# Patient Record
Sex: FEMALE | Race: WHITE | Employment: OTHER | URBAN - METROPOLITAN AREA
[De-identification: names, ages, dates, MRNs, and addresses within clinical notes are randomized per-mention and may not be internally consistent; named-entity substitution may affect disease eponyms.]

---

## 2017-01-19 ENCOUNTER — OFFICE VISIT (OUTPATIENT)
Dept: INTERNAL MEDICINE CLINIC | Age: 67
End: 2017-01-19

## 2017-01-19 VITALS
WEIGHT: 158 LBS | SYSTOLIC BLOOD PRESSURE: 122 MMHG | BODY MASS INDEX: 26.98 KG/M2 | HEIGHT: 64 IN | DIASTOLIC BLOOD PRESSURE: 60 MMHG | HEART RATE: 76 BPM

## 2017-01-19 DIAGNOSIS — E03.9 ACQUIRED HYPOTHYROIDISM: Chronic | ICD-10-CM

## 2017-01-19 DIAGNOSIS — K44.9 HIATAL HERNIA: Chronic | ICD-10-CM

## 2017-01-19 DIAGNOSIS — D72.810 LYMPHOCYTOPENIA: ICD-10-CM

## 2017-01-19 DIAGNOSIS — I10 ESSENTIAL HYPERTENSION: Primary | Chronic | ICD-10-CM

## 2017-01-19 DIAGNOSIS — M47.819 SPONDYLOARTHROPATHY: Chronic | ICD-10-CM

## 2017-01-19 DIAGNOSIS — E78.00 PURE HYPERCHOLESTEROLEMIA: Chronic | ICD-10-CM

## 2017-01-19 PROCEDURE — 99214 OFFICE O/P EST MOD 30 MIN: CPT | Performed by: INTERNAL MEDICINE

## 2017-03-06 RX ORDER — METOPROLOL SUCCINATE 25 MG/1
TABLET, EXTENDED RELEASE ORAL
Qty: 30 TABLET | Refills: 5 | Status: SHIPPED | OUTPATIENT
Start: 2017-03-06 | End: 2017-09-01 | Stop reason: SDUPTHER

## 2017-03-29 ENCOUNTER — TELEPHONE (OUTPATIENT)
Dept: INTERNAL MEDICINE CLINIC | Age: 67
End: 2017-03-29

## 2017-03-29 RX ORDER — CITALOPRAM 10 MG/1
TABLET ORAL
Qty: 30 TABLET | Refills: 4 | Status: SHIPPED | OUTPATIENT
Start: 2017-03-29 | End: 2017-08-23 | Stop reason: SDUPTHER

## 2017-03-30 ENCOUNTER — OFFICE VISIT (OUTPATIENT)
Dept: INTERNAL MEDICINE CLINIC | Age: 67
End: 2017-03-30

## 2017-03-30 VITALS
BODY MASS INDEX: 27.25 KG/M2 | TEMPERATURE: 98 F | WEIGHT: 160 LBS | DIASTOLIC BLOOD PRESSURE: 61 MMHG | HEART RATE: 56 BPM | OXYGEN SATURATION: 98 % | SYSTOLIC BLOOD PRESSURE: 138 MMHG

## 2017-03-30 DIAGNOSIS — L30.9 DERMATITIS: Primary | ICD-10-CM

## 2017-03-30 PROCEDURE — 96372 THER/PROPH/DIAG INJ SC/IM: CPT | Performed by: INTERNAL MEDICINE

## 2017-03-30 PROCEDURE — 99213 OFFICE O/P EST LOW 20 MIN: CPT | Performed by: INTERNAL MEDICINE

## 2017-03-30 RX ORDER — METHYLPREDNISOLONE ACETATE 40 MG/ML
40 INJECTION, SUSPENSION INTRA-ARTICULAR; INTRALESIONAL; INTRAMUSCULAR; SOFT TISSUE ONCE
Status: COMPLETED | OUTPATIENT
Start: 2017-03-30 | End: 2017-03-30

## 2017-03-30 RX ORDER — BETAMETHASONE DIPROPIONATE 0.5 MG/G
CREAM TOPICAL
Qty: 1 TUBE | Refills: 0 | Status: SHIPPED | OUTPATIENT
Start: 2017-03-30 | End: 2017-04-04 | Stop reason: SDUPTHER

## 2017-03-30 RX ADMIN — METHYLPREDNISOLONE ACETATE 40 MG: 40 INJECTION, SUSPENSION INTRA-ARTICULAR; INTRALESIONAL; INTRAMUSCULAR; SOFT TISSUE at 13:59

## 2017-04-03 ENCOUNTER — TELEPHONE (OUTPATIENT)
Dept: DERMATOLOGY | Age: 67
End: 2017-04-03

## 2017-04-04 ENCOUNTER — OFFICE VISIT (OUTPATIENT)
Dept: DERMATOLOGY | Age: 67
End: 2017-04-04

## 2017-04-04 DIAGNOSIS — L30.9 DERMATITIS: ICD-10-CM

## 2017-04-04 PROCEDURE — 99202 OFFICE O/P NEW SF 15 MIN: CPT | Performed by: DERMATOLOGY

## 2017-04-04 RX ORDER — BETAMETHASONE DIPROPIONATE 0.5 MG/G
CREAM TOPICAL
Qty: 50 G | Refills: 0 | Status: SHIPPED | OUTPATIENT
Start: 2017-04-04 | End: 2017-07-24 | Stop reason: SDUPTHER

## 2017-05-18 ENCOUNTER — OFFICE VISIT (OUTPATIENT)
Dept: DERMATOLOGY | Age: 67
End: 2017-05-18

## 2017-05-18 DIAGNOSIS — L30.9 CHRONIC DERMATITIS: Primary | ICD-10-CM

## 2017-05-18 PROCEDURE — 99213 OFFICE O/P EST LOW 20 MIN: CPT | Performed by: DERMATOLOGY

## 2017-05-18 RX ORDER — CALCIUM CARBONATE 500(1250)
500 TABLET ORAL DAILY
COMMUNITY
End: 2020-04-28 | Stop reason: ALTCHOICE

## 2017-06-08 RX ORDER — SUMATRIPTAN 50 MG/1
TABLET, FILM COATED ORAL
Qty: 12 TABLET | Refills: 2 | Status: SHIPPED | OUTPATIENT
Start: 2017-06-08 | End: 2018-03-05 | Stop reason: SDUPTHER

## 2017-07-05 ENCOUNTER — TELEPHONE (OUTPATIENT)
Dept: DERMATOLOGY | Age: 67
End: 2017-07-05

## 2017-07-05 RX ORDER — PREDNISONE 10 MG/1
TABLET ORAL
Qty: 30 TABLET | Refills: 0 | Status: SHIPPED | OUTPATIENT
Start: 2017-07-05 | End: 2017-09-14 | Stop reason: ALTCHOICE

## 2017-07-14 ENCOUNTER — TELEPHONE (OUTPATIENT)
Dept: INTERNAL MEDICINE CLINIC | Age: 67
End: 2017-07-14

## 2017-07-24 ENCOUNTER — OFFICE VISIT (OUTPATIENT)
Dept: INTERNAL MEDICINE CLINIC | Age: 67
End: 2017-07-24

## 2017-07-24 VITALS
HEART RATE: 55 BPM | BODY MASS INDEX: 26.4 KG/M2 | DIASTOLIC BLOOD PRESSURE: 61 MMHG | SYSTOLIC BLOOD PRESSURE: 136 MMHG | OXYGEN SATURATION: 99 % | WEIGHT: 155 LBS

## 2017-07-24 DIAGNOSIS — L30.9 CHRONIC DERMATITIS: Primary | ICD-10-CM

## 2017-07-24 DIAGNOSIS — L30.9 DERMATITIS: ICD-10-CM

## 2017-07-24 PROCEDURE — 99212 OFFICE O/P EST SF 10 MIN: CPT | Performed by: NURSE PRACTITIONER

## 2017-07-24 RX ORDER — BETAMETHASONE DIPROPIONATE 0.5 MG/G
CREAM TOPICAL
Qty: 50 G | Refills: 2 | Status: SHIPPED | OUTPATIENT
Start: 2017-07-24 | End: 2018-09-20 | Stop reason: ALTCHOICE

## 2017-08-23 RX ORDER — CITALOPRAM 10 MG/1
TABLET ORAL
Qty: 30 TABLET | Refills: 0 | Status: SHIPPED | OUTPATIENT
Start: 2017-08-23 | End: 2017-09-19 | Stop reason: SDUPTHER

## 2017-08-24 RX ORDER — CANDESARTAN 16 MG/1
TABLET ORAL
Qty: 30 TABLET | Refills: 5 | Status: SHIPPED | OUTPATIENT
Start: 2017-08-24 | End: 2017-08-25 | Stop reason: SDUPTHER

## 2017-08-25 RX ORDER — CANDESARTAN 16 MG/1
TABLET ORAL
Qty: 30 TABLET | Refills: 5 | Status: SHIPPED | OUTPATIENT
Start: 2017-08-25 | End: 2017-10-30 | Stop reason: SDUPTHER

## 2017-08-29 ENCOUNTER — HOSPITAL ENCOUNTER (OUTPATIENT)
Dept: MAMMOGRAPHY | Age: 67
Discharge: HOME | End: 2017-08-29
Attending: INTERNAL MEDICINE | Admitting: INTERNAL MEDICINE

## 2017-08-29 DIAGNOSIS — Z12.31 VISIT FOR SCREENING MAMMOGRAM: ICD-10-CM

## 2017-09-01 RX ORDER — METOPROLOL SUCCINATE 25 MG/1
TABLET, EXTENDED RELEASE ORAL
Qty: 30 TABLET | Refills: 5 | Status: SHIPPED | OUTPATIENT
Start: 2017-09-01 | End: 2018-03-11 | Stop reason: SDUPTHER

## 2017-09-14 ENCOUNTER — OFFICE VISIT (OUTPATIENT)
Dept: INTERNAL MEDICINE CLINIC | Age: 67
End: 2017-09-14

## 2017-09-14 VITALS
WEIGHT: 157 LBS | HEART RATE: 62 BPM | SYSTOLIC BLOOD PRESSURE: 122 MMHG | BODY MASS INDEX: 26.74 KG/M2 | DIASTOLIC BLOOD PRESSURE: 70 MMHG | OXYGEN SATURATION: 96 %

## 2017-09-14 DIAGNOSIS — Z12.11 SCREENING FOR COLON CANCER: ICD-10-CM

## 2017-09-14 DIAGNOSIS — I10 ESSENTIAL HYPERTENSION: Primary | Chronic | ICD-10-CM

## 2017-09-14 DIAGNOSIS — E78.00 PURE HYPERCHOLESTEROLEMIA: Chronic | ICD-10-CM

## 2017-09-14 DIAGNOSIS — L30.9 DERMATITIS: ICD-10-CM

## 2017-09-14 DIAGNOSIS — D72.810 LYMPHOCYTOPENIA: ICD-10-CM

## 2017-09-14 DIAGNOSIS — F32.5 MAJOR DEPRESSIVE DISORDER WITH SINGLE EPISODE, IN FULL REMISSION (HCC): Chronic | ICD-10-CM

## 2017-09-14 DIAGNOSIS — K44.9 HIATAL HERNIA: Chronic | ICD-10-CM

## 2017-09-14 DIAGNOSIS — E03.9 ACQUIRED HYPOTHYROIDISM: Chronic | ICD-10-CM

## 2017-09-14 PROCEDURE — G0008 ADMIN INFLUENZA VIRUS VAC: HCPCS | Performed by: INTERNAL MEDICINE

## 2017-09-14 PROCEDURE — 90662 IIV NO PRSV INCREASED AG IM: CPT | Performed by: INTERNAL MEDICINE

## 2017-09-14 PROCEDURE — 99214 OFFICE O/P EST MOD 30 MIN: CPT | Performed by: INTERNAL MEDICINE

## 2017-09-14 ASSESSMENT — PATIENT HEALTH QUESTIONNAIRE - PHQ9
2. FEELING DOWN, DEPRESSED OR HOPELESS: 0
SUM OF ALL RESPONSES TO PHQ QUESTIONS 1-9: 0
SUM OF ALL RESPONSES TO PHQ9 QUESTIONS 1 & 2: 0
1. LITTLE INTEREST OR PLEASURE IN DOING THINGS: 0

## 2017-09-19 RX ORDER — CITALOPRAM 10 MG/1
TABLET ORAL
Qty: 30 TABLET | Refills: 5 | Status: SHIPPED | OUTPATIENT
Start: 2017-09-19 | End: 2018-03-22 | Stop reason: SDUPTHER

## 2017-09-26 RX ORDER — LEVOTHYROXINE SODIUM 0.12 MG/1
125 TABLET ORAL DAILY
Qty: 30 TABLET | Refills: 5 | Status: SHIPPED | OUTPATIENT
Start: 2017-09-26 | End: 2018-03-26 | Stop reason: SDUPTHER

## 2017-10-02 RX ORDER — FLUTICASONE PROPIONATE 50 MCG
SPRAY, SUSPENSION (ML) NASAL
Qty: 3 BOTTLE | Refills: 1 | Status: SHIPPED | OUTPATIENT
Start: 2017-10-02 | End: 2019-03-22 | Stop reason: SDUPTHER

## 2017-10-30 ENCOUNTER — TELEPHONE (OUTPATIENT)
Dept: DERMATOLOGY | Age: 67
End: 2017-10-30

## 2017-10-30 RX ORDER — CANDESARTAN 16 MG/1
TABLET ORAL
Qty: 90 TABLET | Refills: 1 | Status: SHIPPED | OUTPATIENT
Start: 2017-10-30 | End: 2018-09-19 | Stop reason: SDUPTHER

## 2017-10-30 NOTE — TELEPHONE ENCOUNTER
If her legs are a lot worse and the betamethasone is not helping, I want to see her back in the office to see what's going on.

## 2017-10-30 NOTE — TELEPHONE ENCOUNTER
Elkin Palmer has a rash on her lower leg that isn't clearing, her leg is also swollen where the rash is. She has been seen for this in the past and is wondering if Dr. Yang Borja could call in prednisone? The cream she is using isn't working any longer.     Pharmacy:  [facility] [address], [address] [phone]

## 2017-10-31 ENCOUNTER — OFFICE VISIT (OUTPATIENT)
Dept: DERMATOLOGY | Age: 67
End: 2017-10-31

## 2017-10-31 DIAGNOSIS — L30.9 CHRONIC DERMATITIS: Primary | ICD-10-CM

## 2017-10-31 PROCEDURE — 99213 OFFICE O/P EST LOW 20 MIN: CPT | Performed by: DERMATOLOGY

## 2017-10-31 RX ORDER — CLOBETASOL PROPIONATE 0.5 MG/G
CREAM TOPICAL
Qty: 60 G | Refills: 2 | Status: SHIPPED | OUTPATIENT
Start: 2017-10-31 | End: 2018-09-20 | Stop reason: ALTCHOICE

## 2017-10-31 NOTE — PROGRESS NOTES
[facility]  Chris Ospina MD  [address]  1950    75 y.o. female     Date of Visit: 10/31/2017    Chief Complaint: rash worse    History of Present Illness:    She returns today to follow-up for pretibial papular dermatitis of the legs. She reports flaring in recent weeks. She reports that Diprolene cream has not been as effective lately. Review of Systems:  Gen: Feels well, good sense of health. Skin: no other rash. Past Medical History, Family History, Surgical History, Medications and Allergies reviewed. Past Medical History:   Diagnosis Date    Allergic rhinitis     Anxiety     Depression     Hiatal hernia     Hyperlipidemia     Hypertension     Hypothyroidism     Migraine headache     Osteoporosis     Overactive bladder     Rupture of tendon of foot region     Right    Spondyloarthropathy ([address]     L5-S1     Past Surgical History:   Procedure Laterality Date    ANKLE SURGERY  1/17/11    Right gastroc lenghtening, proximal tibial bone graft triple arthrodesis of ankle    DILATION AND CURETTAGE OF UTERUS      x2    KNEE ARTHROSCOPY Left 1/28/15    TEAR DUCT SURGERY  2002       Allergies   Allergen Reactions    Cephalexin Hives     Outpatient Prescriptions Marked as Taking for the 10/31/17 encounter (Office Visit) with Sophia Domínguez MD   Medication Sig Dispense Refill    clobetasol (TEMOVATE) 0.05 % cream Apply to affected areas on the left leg daily under occlusion until improved.  60 g 2    candesartan (ATACAND) 16 MG tablet TAKE ONE TABLET BY MOUTH DAILY 90 tablet 1    fluticasone (FLONASE) 50 MCG/ACT nasal spray PLACE ONE SPRAY IN EACH NOSTRILS DAILY 3 Bottle 1    levothyroxine (SYNTHROID) 125 MCG tablet Take 1 tablet by mouth daily 30 tablet 5    citalopram (CELEXA) 10 MG tablet TAKE ONE TABLET BY MOUTH DAILY 30 tablet 5    Aspirin-Acetaminophen-Caffeine (EXCEDRIN MIGRAINE PO) Take by mouth      metoprolol succinate (TOPROL XL) 25 MG extended release tablet TAKE ONE TABLET BY MOUTH DAILY 30 tablet 5    augmented betamethasone dipropionate (DIPROLENE-AF) 0.05 % cream APPLY NIGHTLY UNDER OCCLUSION FOR UP TO 2 WEEKS OR UNTIL IMPROVED 50 g 2    SUMAtriptan (IMITREX) 50 MG tablet TAKE ONE TABLET BY MOUTH AS NEEDED FOR MIGRAINE FOR A SINGLE DOSE 12 tablet 2    calcium carbonate (OSCAL) 500 MG TABS tablet Take 500 mg by mouth daily      levothyroxine (SYNTHROID) 100 MCG tablet TAKE ONE TABLET BY MOUTH DAILY 30 tablet 11    Lansoprazole (PREVACID PO) Take 15 mg by mouth daily.  raloxifene (EVISTA) 60 MG tablet Take 60 mg by mouth daily.  Multiple Minerals-Vitamins (CITRACAL PLUS PO) Take 1 tablet by mouth 2 times daily (with meals).  Multiple Vitamins-Minerals (CENTRUM SILVER PO) Take 1 tablet by mouth daily.  Misc Intestinal Lou Regulat (ALIGN PO) Take  by mouth. Physical Examination       The following were examined and determined to be normal: Psych/Neuro, Head/face, Conjunctivae/eyelids, Gums/teeth/lips, Neck and RLE. The following were examined and determined to be abnormal: LLE. Well-appearing. 1.  Left lower leg with discrete and coalescing erythematous edematous papules and plaques with foci of crusting. Assessment and Plan     1. Pretibial papular dermatitis - flaring    Clobetasol cream daily under occlusion until improved and then as needed for recurrence.

## 2017-11-03 ENCOUNTER — TELEPHONE (OUTPATIENT)
Dept: OTHER | Facility: CLINIC | Age: 67
End: 2017-11-03

## 2017-11-03 NOTE — TELEPHONE ENCOUNTER
Valentine Will said that she was not interested and would not let me go into detail. Need to check status of FIT test and see if patient is planning on completing it.

## 2017-11-28 ENCOUNTER — OFFICE VISIT (OUTPATIENT)
Dept: DERMATOLOGY | Age: 67
End: 2017-11-28

## 2017-11-28 DIAGNOSIS — L30.9 CHRONIC DERMATITIS: Primary | ICD-10-CM

## 2017-11-28 PROCEDURE — 99212 OFFICE O/P EST SF 10 MIN: CPT | Performed by: DERMATOLOGY

## 2017-11-28 NOTE — PROGRESS NOTES
[facility] Dermatology  Ann Marie Leach MD  [address]  1950    75 y.o. female     Date of Visit: 11/28/2017    Chief Complaint: dermatitis    History of Present Illness:    She returns today to follow-up for pretibial papular dermatitis of the legs. She reports marked improvement with use of clobetasol cream nightly under occlusion. She has few purpuric patches today. She is also using an Aveeno eczema cream to moisturize. Review of Systems:  None. Past Medical History, Family History, Surgical History, Medications and Allergies reviewed. Past Medical History:   Diagnosis Date    Allergic rhinitis     Anxiety     Depression     Hiatal hernia     Hyperlipidemia     Hypertension     Hypothyroidism     Migraine headache     Osteoporosis     Overactive bladder     Rupture of tendon of foot region     Right    Spondyloarthropathy ([address]     L5-S1     Past Surgical History:   Procedure Laterality Date    ANKLE SURGERY  1/17/11    Right gastroc lenghtening, proximal tibial bone graft triple arthrodesis of ankle    DILATION AND CURETTAGE OF UTERUS      x2    KNEE ARTHROSCOPY Left 1/28/15    TEAR DUCT SURGERY  2002       Allergies   Allergen Reactions    Cephalexin Hives     Outpatient Prescriptions Marked as Taking for the 11/28/17 encounter (Office Visit) with Junior Alan MD   Medication Sig Dispense Refill    clobetasol (TEMOVATE) 0.05 % cream Apply to affected areas on the left leg daily under occlusion until improved.  60 g 2    candesartan (ATACAND) 16 MG tablet TAKE ONE TABLET BY MOUTH DAILY 90 tablet 1    fluticasone (FLONASE) 50 MCG/ACT nasal spray PLACE ONE SPRAY IN EACH NOSTRILS DAILY 3 Bottle 1    levothyroxine (SYNTHROID) 125 MCG tablet Take 1 tablet by mouth daily 30 tablet 5    citalopram (CELEXA) 10 MG tablet TAKE ONE TABLET BY MOUTH DAILY 30 tablet 5    Aspirin-Acetaminophen-Caffeine (EXCEDRIN MIGRAINE PO) Take by mouth

## 2017-12-28 ENCOUNTER — OFFICE VISIT (OUTPATIENT)
Dept: INTERNAL MEDICINE CLINIC | Age: 67
End: 2017-12-28

## 2017-12-28 VITALS
WEIGHT: 155 LBS | DIASTOLIC BLOOD PRESSURE: 90 MMHG | BODY MASS INDEX: 26.4 KG/M2 | HEART RATE: 60 BPM | SYSTOLIC BLOOD PRESSURE: 140 MMHG

## 2017-12-28 DIAGNOSIS — S09.90XA INJURY OF HEAD, INITIAL ENCOUNTER: ICD-10-CM

## 2017-12-28 DIAGNOSIS — G44.319 ACUTE POST-TRAUMATIC HEADACHE, NOT INTRACTABLE: ICD-10-CM

## 2017-12-28 DIAGNOSIS — H53.8 BLURRING, LEFT EYE: ICD-10-CM

## 2017-12-28 PROBLEM — G44.309 POST-TRAUMATIC HEADACHE: Status: ACTIVE | Noted: 2017-12-28

## 2017-12-28 PROCEDURE — 99214 OFFICE O/P EST MOD 30 MIN: CPT | Performed by: INTERNAL MEDICINE

## 2017-12-28 NOTE — PATIENT INSTRUCTIONS
Patient Education        Learning About a Closed Head Injury  What is a closed head injury? A closed head injury happens when your head gets hit hard. The strong force of the blow causes your brain to shake in your skull. This movement can cause the brain to bruise, swell, or tear. Sometimes nerves or blood vessels also get damaged. This can cause bleeding in or around the brain. A concussion is a type of closed head injury. What are the symptoms? If you have a mild concussion, you may have a mild headache or feel \"not quite right. \" These symptoms are common. They usually go away over a few days to 4 weeks. But sometimes after a concussion, you feel like you can't function as well as before the injury. And you have new symptoms. This is called postconcussive syndrome. You may:  · Find it harder to solve problems, think, concentrate, or remember. · Have headaches. · Have changes in your sleep patterns, such as not being able to sleep or sleeping all the time. · Have changes in your personality. · Not be interested in your usual activities. · Feel angry or anxious without a clear reason. · Lose your sense of taste or smell. · Be dizzy, lightheaded, or unsteady. It may be hard to stand or walk. How is a closed head injury treated? Any person who may have a concussion needs to see a doctor. Some people have to stay in the hospital to be watched. Others can go home safely. If you go home, follow your doctor's instructions. He or she will tell you if you need someone to watch you closely for the next 24 hours or longer. Rest is the best treatment. Get plenty of sleep at night. And try to rest during the day. · Avoid activities that are physically or mentally demanding. These include housework, exercise, and schoolwork. And don't play video games, send text messages, or use the computer. You may need to change your school or work schedule to be able to avoid these activities.   · Ask your doctor when it's okay to drive, ride a bike, or operate machinery. · Take an over-the-counter pain medicine, such as acetaminophen (Tylenol), ibuprofen (Advil, Motrin), or naproxen (Aleve). Be safe with medicines. Read and follow all instructions on the label. · Check with your doctor before you use any other medicines for pain. · Do not drink alcohol or use illegal drugs. They can slow recovery. They can also increase your risk of getting a second head injury. Follow-up care is a key part of your treatment and safety. Be sure to make and go to all appointments, and call your doctor if you are having problems. It's also a good idea to know your test results and keep a list of the medicines you take. Where can you learn more? Go to [email or web address].[email or web address] org and sign in to your [employer] account. Enter [phone] in the Qraved box to learn more about \"Learning About a Closed Head Injury. \"     If you do not have an account, please click on the \"Sign Up Now\" link. Current as of: October 14, 2016  Content Version: 11.4  © 7522-2378 Healthwise, [employer]. Care instructions adapted under license by [facility] ([facility]). If you have questions about a medical condition or this instruction, always ask your healthcare professional. [address] any warranty or liability for your use of this information.

## 2017-12-28 NOTE — PROGRESS NOTES
Assessment/Plan     1. Injury of head, initial encounter  Neurologic exam non-focal, but may have mild concussion. No evidence of subdural, but she will go to ED for any new or worsening neurologic symptoms. 2. Acute post-traumatic headache, not intractable  She was advised to discontinue scheduled Excedrine Migraine due to concerns about possible rebound. Does not tolerate NSAIDs, so will try Tylenol and ice. Patient will call if symptoms change or worsen. 3. Blurring, left eye  Etiology unclear, but she will see ophthalmologist or optometrist today for dilated slit lamp exam.            Tarsha Oropeza   YOB: 1950    Date of Visit:  12/28/2017    Prior to Visit Medications    Medication Sig Taking? Authorizing Provider   clobetasol (TEMOVATE) 0.05 % cream Apply to affected areas on the left leg daily under occlusion until improved. Yes Estella Linton MD   candesartan (ATACAND) 16 MG tablet TAKE ONE TABLET BY MOUTH DAILY Yes Mitra Hughes MD   levothyroxine (SYNTHROID) 125 MCG tablet Take 1 tablet by mouth daily Yes Mitra Hughes MD   citalopram (CELEXA) 10 MG tablet TAKE ONE TABLET BY MOUTH DAILY Yes Mitra Hughes MD   Aspirin-Acetaminophen-Caffeine (EXCEDRIN MIGRAINE PO) Take by mouth Yes Historical Provider, MD   metoprolol succinate (TOPROL XL) 25 MG extended release tablet TAKE ONE TABLET BY MOUTH DAILY Yes Mitra Hughes MD   augmented betamethasone dipropionate (DIPROLENE-AF) 0.05 % cream APPLY NIGHTLY UNDER OCCLUSION FOR UP TO 2 WEEKS OR UNTIL IMPROVED Yes Estella Linton MD   SUMAtriptan (IMITREX) 50 MG tablet TAKE ONE TABLET BY MOUTH AS NEEDED FOR MIGRAINE FOR A SINGLE DOSE Yes Mitra Hughes MD   calcium carbonate (OSCAL) 500 MG TABS tablet Take 500 mg by mouth daily Yes Historical Provider, MD   levothyroxine (SYNTHROID) 100 MCG tablet TAKE ONE TABLET BY MOUTH DAILY Yes Mitra Hughes MD   Lansoprazole (PREVACID PO) Take 15 mg by mouth daily.  Yes Historical

## 2018-02-27 ENCOUNTER — OFFICE VISIT (OUTPATIENT)
Dept: DERMATOLOGY | Age: 68
End: 2018-02-27

## 2018-02-27 DIAGNOSIS — L30.9 CHRONIC DERMATITIS: Primary | ICD-10-CM

## 2018-02-27 PROCEDURE — 99212 OFFICE O/P EST SF 10 MIN: CPT | Performed by: DERMATOLOGY

## 2018-02-27 NOTE — PROGRESS NOTES
[facility] Dermatology  Yael Michelle MD  [phone]      Calvin Servin  1950    75 y.o. female     Date of Visit: 2/27/2018    Chief Complaint: dermatitis    History of Present Illness:    She returns today to follow-up for chronic dermatitis of the legs (so called pretibial papular dermatitis). She reports good control since last visit - has overall been quiescent. Rarely uses clobetasol. Uses Aveeno cream to moisturize daily. Review of Systems:  None. Past Medical History, Family History, Surgical History, Medications and Allergies reviewed.     Past Medical History:   Diagnosis Date    Allergic rhinitis     Anxiety     Depression     Hiatal hernia     Hyperlipidemia     Hypertension     Hypothyroidism     Migraine headache     Osteoporosis     Overactive bladder     Rupture of tendon of foot region     Right    Spondyloarthropathy (H[facility])     L5-S1     Past Surgical History:   Procedure Laterality Date    ANKLE SURGERY  1/17/11    Right gastroc lenghtening, proximal tibial bone graft triple arthrodesis of ankle    DILATION AND CURETTAGE OF UTERUS      x2    KNEE ARTHROSCOPY Left 1/28/15    TEAR DUCT SURGERY  2002       Allergies   Allergen Reactions    Cephalexin Hives     Outpatient Prescriptions Marked as Taking for the 2/27/18 encounter (Office Visit) with Rasheed Telles MD   Medication Sig Dispense Refill    candesartan (ATACAND) 16 MG tablet TAKE ONE TABLET BY MOUTH DAILY 90 tablet 1    fluticasone (FLONASE) 50 MCG/ACT nasal spray PLACE ONE SPRAY IN EACH NOSTRILS DAILY 3 Bottle 1    levothyroxine (SYNTHROID) 125 MCG tablet Take 1 tablet by mouth daily 30 tablet 5    citalopram (CELEXA) 10 MG tablet TAKE ONE TABLET BY MOUTH DAILY 30 tablet 5    Aspirin-Acetaminophen-Caffeine (EXCEDRIN MIGRAINE PO) Take by mouth      metoprolol succinate (TOPROL XL) 25 MG extended release tablet TAKE ONE TABLET BY MOUTH DAILY 30 tablet 5    SUMAtriptan

## 2018-03-05 RX ORDER — SUMATRIPTAN 50 MG/1
TABLET, FILM COATED ORAL
Qty: 12 TABLET | Refills: 1 | Status: SHIPPED | OUTPATIENT
Start: 2018-03-05 | End: 2018-08-03 | Stop reason: SDUPTHER

## 2018-03-12 RX ORDER — METOPROLOL SUCCINATE 25 MG/1
TABLET, EXTENDED RELEASE ORAL
Qty: 30 TABLET | Refills: 5 | Status: SHIPPED | OUTPATIENT
Start: 2018-03-12 | End: 2018-09-12 | Stop reason: SDUPTHER

## 2018-03-20 ENCOUNTER — OFFICE VISIT (OUTPATIENT)
Dept: INTERNAL MEDICINE CLINIC | Age: 68
End: 2018-03-20

## 2018-03-20 VITALS
BODY MASS INDEX: 26.57 KG/M2 | SYSTOLIC BLOOD PRESSURE: 134 MMHG | DIASTOLIC BLOOD PRESSURE: 70 MMHG | HEART RATE: 72 BPM | WEIGHT: 156 LBS

## 2018-03-20 DIAGNOSIS — E03.9 ACQUIRED HYPOTHYROIDISM: Chronic | ICD-10-CM

## 2018-03-20 DIAGNOSIS — M47.819 SPONDYLOARTHROPATHY: Chronic | ICD-10-CM

## 2018-03-20 DIAGNOSIS — M81.0 AGE-RELATED OSTEOPOROSIS WITHOUT CURRENT PATHOLOGICAL FRACTURE: ICD-10-CM

## 2018-03-20 DIAGNOSIS — D72.810 LYMPHOCYTOPENIA: ICD-10-CM

## 2018-03-20 DIAGNOSIS — K44.9 HIATAL HERNIA: Chronic | ICD-10-CM

## 2018-03-20 DIAGNOSIS — F32.5 MAJOR DEPRESSIVE DISORDER WITH SINGLE EPISODE, IN FULL REMISSION (HCC): Chronic | ICD-10-CM

## 2018-03-20 DIAGNOSIS — I10 ESSENTIAL HYPERTENSION: Primary | Chronic | ICD-10-CM

## 2018-03-20 PROBLEM — H53.8 BLURRING, LEFT EYE: Status: RESOLVED | Noted: 2017-12-28 | Resolved: 2018-03-20

## 2018-03-20 PROBLEM — S09.90XA HEAD INJURY: Status: RESOLVED | Noted: 2017-12-28 | Resolved: 2018-03-20

## 2018-03-20 PROBLEM — G44.309 POST-TRAUMATIC HEADACHE: Status: RESOLVED | Noted: 2017-12-28 | Resolved: 2018-03-20

## 2018-03-20 PROCEDURE — 99214 OFFICE O/P EST MOD 30 MIN: CPT | Performed by: INTERNAL MEDICINE

## 2018-03-20 NOTE — PATIENT INSTRUCTIONS
Patient Education        Osteoporosis: Care Instructions  Your Care Instructions    Osteoporosis causes bones to become thin and weak. It is much more common in women than in men. Osteoporosis may be very advanced before you know you have it. Sometimes the first sign is a broken bone in the hip, spine, or wrist or sudden pain in your middle or lower back. Follow-up care is a key part of your treatment and safety. Be sure to make and go to all appointments, and call your doctor if you are having problems. It's also a good idea to know your test results and keep a list of the medicines you take. How can you care for yourself at home? · Your doctor may prescribe a bisphosphonate, such as risedronate (Actonel) or alendronate (Fosamax), for osteoporosis. If you are taking one of these medicines by mouth:  ¨ Take your medicine with a full glass of water when you first get up in the morning. ¨ Do not lie down, eat, drink a beverage, or take any other medicine for at least 30 minutes after taking the drug. This helps prevent stomach problems. ¨ Do not take your medicine late in the day if you forgot to take it in the morning. Skip it, and take the usual dose the next morning. ¨ If you have side effects, tell your doctor. He or she may prescribe another medicine. · Get enough calcium and vitamin D. The [place] [employer] recommends adults younger than age 46 need 1,000 mg of calcium and 600 IU of vitamin D each day. Women ages 46 to 79 need 1,200 mg of calcium and 600 IU of vitamin D each day. Men ages 46 to 79 need 1,000 mg of calcium and 600 IU of vitamin D each day. Adults 71 and older need 1,200 mg of calcium and 800 IU of vitamin D each day. ¨ Eat foods rich in calcium, like yogurt, cheese, milk, and dark green vegetables. This is a good way to get the calcium you need. You can get vitamin D from eggs, fatty fish, cereal, and milk. ¨ Talk to your doctor about taking a calcium plus vitamin D supplement.  Be

## 2018-03-20 NOTE — PROGRESS NOTES
noted.  Use of agents associated with hypertension: none. Sodium (mmol/L)   Date Value   09/21/2017 139    BUN (mg/dL)   Date Value   09/21/2017 9    Glucose (mg/dL)   Date Value   09/21/2017 86      Potassium (mmol/L)   Date Value   09/21/2017 4.0    CREATININE (mg/dL)   Date Value   09/21/2017 0.62         Hypothyroidism: Recent symptoms: fatigue, hair loss- improved on higher dose of Synthroid. She denies weight changes, dry skin, diarrhea and constipation. Patient is  taking her medication consistently on an empty stomach. No results found for: [facility]  Lab Results   Component Value Date    TSH 10.750 (H) 09/21/2017    TSH 3.010 01/31/2017    TSH 0.28 07/07/2016     GI Disorder:  Patient presents for follow-up of GERD- chronic. Current symptoms include none. Symptoms are unchanged since last visit. Patient denies nausea, heartburn, dysphagia, heartburn, vomiting, abdominal pain, abdominal bloating, constipation, diarrhea, melena and hematochezia. Current treatment includes: proton pump inhibitor- Prevacid 15 mg qd. Medication side effects:  none. Recent diagnostic testing: none. Mood Disorder:  Patient presents for follow-up of depression. Current complaints include: none. She denies anhedonia, depressed mood, tearfulness, feelings of hopelessness, insomnia, difficulty concentrating, irritability and excessive worry. Symptoms/signs of lili: none. External stressors: nothing new. Current treatment includes: Celexa- 10 mg qd. Medication side effects: none. Osteoporosis/osteopenia:  Current pharmacologic therapy and date started Evista (raloxifene)- 2012. Medication side effects: none. Last DXA:  3/16-  T score at spine -1.5 and lowest hip -1.5, which was better compared to previous scan. Current calcium intake is at least 1200 mg/day from diet and supplements: yes. She is currently taking 2000 IU/day of supplemental vitamin D.   Regular weight-bearing exercise: no.    Vit D, 25-Hydroxy (ng/ml)   Date Value   06/05/2012 34.5     Chronic Back Pain: Pain is unchanged. On average, pain is perceived as mild (1-3  pain scale). Change in quality of symptoms: no.  Associated symptoms: none. She denies weakness, change in gait, paresthesias, saddle anesthesia and new bowel or bladder dysfunction. Current treatment: home exercises. Medication side effects: not applicable . Recent diagnostic testing: none. Review of Systems  As documented in HPI    Physical Exam   Constitutional: She is oriented to person, place, and time. She appears well-developed and well-nourished. No distress. HENT:   Mouth/Throat: Oropharynx is clear and moist and mucous membranes are normal.   Eyes: Conjunctivae are normal.   Neck: Carotid bruit is not present. No thyroid mass and no thyromegaly present. Cardiovascular: Normal rate, regular rhythm, normal heart sounds, intact distal pulses and normal pulses. Exam reveals no gallop and no friction rub. No murmur heard. Pulmonary/Chest: Effort normal and breath sounds normal. No respiratory distress. She has no wheezes. She has no rhonchi. She has no rales. Abdominal: Soft. She exhibits no distension. There is no tenderness. Musculoskeletal: She exhibits no edema. There is no tenderness over the lumbar spine and sacral spine. Paraspinal muscle spasm/tenderness:  No.   Neurological: She is alert and oriented to person, place, and time. Skin: Skin is warm, dry and intact. Psychiatric: She has a normal mood and affect.  Her speech is normal and behavior is normal. Judgment and thought content normal. Cognition and memory are normal.

## 2018-03-22 RX ORDER — CITALOPRAM 10 MG/1
TABLET ORAL
Qty: 30 TABLET | Refills: 5 | Status: SHIPPED | OUTPATIENT
Start: 2018-03-22 | End: 2018-09-15 | Stop reason: SDUPTHER

## 2018-03-26 RX ORDER — LEVOTHYROXINE SODIUM 0.12 MG/1
TABLET ORAL
Qty: 30 TABLET | Refills: 5 | Status: SHIPPED | OUTPATIENT
Start: 2018-03-26 | End: 2018-09-19 | Stop reason: SDUPTHER

## 2018-05-29 ENCOUNTER — PATIENT MESSAGE (OUTPATIENT)
Dept: INTERNAL MEDICINE CLINIC | Age: 68
End: 2018-05-29

## 2018-05-29 RX ORDER — RALOXIFENE HYDROCHLORIDE 60 MG/1
60 TABLET, FILM COATED ORAL DAILY
Qty: 30 TABLET | Refills: 3 | Status: SHIPPED | OUTPATIENT
Start: 2018-05-29 | End: 2018-09-20 | Stop reason: SDUPTHER

## 2018-08-03 RX ORDER — SUMATRIPTAN 50 MG/1
TABLET, FILM COATED ORAL
Qty: 12 TABLET | Refills: 0 | Status: SHIPPED | OUTPATIENT
Start: 2018-08-03 | End: 2018-09-20 | Stop reason: SDUPTHER

## 2018-08-29 ENCOUNTER — HOSPITAL ENCOUNTER (OUTPATIENT)
Dept: GENERAL RADIOLOGY | Age: 68
Discharge: HOME | End: 2018-08-29
Attending: INTERNAL MEDICINE | Admitting: INTERNAL MEDICINE

## 2018-08-29 DIAGNOSIS — M81.0 AGE-RELATED OSTEOPOROSIS WITHOUT CURRENT PATHOLOGICAL FRACTURE: ICD-10-CM

## 2018-08-29 DIAGNOSIS — Z12.31 VISIT FOR SCREENING MAMMOGRAM: ICD-10-CM

## 2018-09-12 RX ORDER — METOPROLOL SUCCINATE 25 MG/1
TABLET, EXTENDED RELEASE ORAL
Qty: 30 TABLET | Refills: 0 | Status: SHIPPED | OUTPATIENT
Start: 2018-09-12 | End: 2018-10-14 | Stop reason: SDUPTHER

## 2018-09-17 RX ORDER — CITALOPRAM 10 MG/1
TABLET ORAL
Qty: 30 TABLET | Refills: 5 | Status: SHIPPED | OUTPATIENT
Start: 2018-09-17 | End: 2019-03-13 | Stop reason: SDUPTHER

## 2018-09-20 ENCOUNTER — OFFICE VISIT (OUTPATIENT)
Dept: INTERNAL MEDICINE CLINIC | Age: 68
End: 2018-09-20

## 2018-09-20 VITALS
DIASTOLIC BLOOD PRESSURE: 70 MMHG | HEIGHT: 65 IN | BODY MASS INDEX: 25.99 KG/M2 | HEART RATE: 64 BPM | OXYGEN SATURATION: 98 % | WEIGHT: 156 LBS | SYSTOLIC BLOOD PRESSURE: 132 MMHG

## 2018-09-20 DIAGNOSIS — E78.00 PURE HYPERCHOLESTEROLEMIA: Chronic | ICD-10-CM

## 2018-09-20 DIAGNOSIS — K44.9 HIATAL HERNIA: Chronic | ICD-10-CM

## 2018-09-20 DIAGNOSIS — E03.4 HYPOTHYROIDISM DUE TO ACQUIRED ATROPHY OF THYROID: ICD-10-CM

## 2018-09-20 DIAGNOSIS — M81.0 AGE-RELATED OSTEOPOROSIS WITHOUT CURRENT PATHOLOGICAL FRACTURE: ICD-10-CM

## 2018-09-20 DIAGNOSIS — F32.5 MAJOR DEPRESSIVE DISORDER WITH SINGLE EPISODE, IN FULL REMISSION (HCC): Chronic | ICD-10-CM

## 2018-09-20 DIAGNOSIS — I10 ESSENTIAL HYPERTENSION: Primary | Chronic | ICD-10-CM

## 2018-09-20 PROCEDURE — 3288F FALL RISK ASSESSMENT DOCD: CPT | Performed by: INTERNAL MEDICINE

## 2018-09-20 PROCEDURE — G0008 ADMIN INFLUENZA VIRUS VAC: HCPCS | Performed by: INTERNAL MEDICINE

## 2018-09-20 PROCEDURE — 99214 OFFICE O/P EST MOD 30 MIN: CPT | Performed by: INTERNAL MEDICINE

## 2018-09-20 PROCEDURE — 90662 IIV NO PRSV INCREASED AG IM: CPT | Performed by: INTERNAL MEDICINE

## 2018-09-20 RX ORDER — SUMATRIPTAN 50 MG/1
TABLET, FILM COATED ORAL
Qty: 12 TABLET | Refills: 3 | Status: SHIPPED | OUTPATIENT
Start: 2018-09-20 | End: 2019-04-16 | Stop reason: ALTCHOICE

## 2018-09-20 RX ORDER — RALOXIFENE HYDROCHLORIDE 60 MG/1
60 TABLET, FILM COATED ORAL DAILY
Qty: 30 TABLET | Refills: 5 | Status: SHIPPED | OUTPATIENT
Start: 2018-09-20 | End: 2019-03-16 | Stop reason: SDUPTHER

## 2018-09-20 NOTE — PROGRESS NOTES
Vaccine Information Sheet, \"Influenza - Inactivated\"  given to Glynn Juan, or parent/legal guardian of  Glynn Juan and verbalized understanding. Patient responses:    Have you ever had a reaction to a flu vaccine? No  Are you able to eat eggs without adverse effects? Yes  Do you have any current illness? No  Have you ever had [place] Syndrome? No    Flu vaccine given per order. Please see immunization tab.

## 2018-09-20 NOTE — PROGRESS NOTES
Assessment/Plan     1. Essential hypertension  Well-controlled. Continue current medications. - Comprehensive Metabolic Panel, Fasting; Future    2. Pure hypercholesterolemia  Importance of continued lifestyle changes stressed to help prevent need for cholesterol medication in the future. Has been intolerant to Lipitor in the past.  - Lipid, Fasting; Future    3. Hypothyroidism due to acquired atrophy of thyroid  Clinically euthyroid, but will adjust levothyroxine dose if indicated by lab results.   - TSH without Reflex; Future    4. Hiatal hernia  Asymptomatic on low dose PPI- has been unable to wean to H2 blocker in the past, so benefits of chronic PPI outweigh risks. Will continue to monitor renal function and bone density. 5. Major depressive disorder with single episode, in full remission ([address]  Well-controlled on low dose Celexa, which she would like to continue. 6. Age-related osteoporosis without current pathological fracture  Does not yet meet criteria for bisphosphonate per FRAX, but losing significant bone density despite Evista. She would like to try regular strength training for the next 2 years, then recheck dexa. If bone density drops any further, will switch to yearly Reclast.  Oral bisphosphonates relatively contraindicated due to GERD. Return in about 6 months (around 3/20/2019). Rusty Bourgeois   YOB: 1950    Date of Visit:  9/20/2018    Allergies   Allergen Reactions    Cephalexin Hives      Prior to Visit Medications    Medication Sig Taking?  Authorizing Provider   levothyroxine (SYNTHROID) 125 MCG tablet TAKE ONE TABLET BY MOUTH DAILY Yes Bakari Fleming MD   candesartan (ATACAND) 16 MG tablet TAKE ONE TABLET BY MOUTH DAILY Yes Bakari Fleming MD   citalopram (CELEXA) 10 MG tablet TAKE ONE TABLET BY MOUTH DAILY Yes Bakari Fleming MD   metoprolol succinate (TOPROL XL) 25 MG extended release tablet TAKE ONE TABLET BY MOUTH DAILY Yes stomach. GI Disorder:  Patient presents for follow-up of GERD- chronic. Current symptoms include none. Symptoms are unchanged since last visit. Patient denies nausea, heartburn, dysphagia, heartburn, vomiting, abdominal pain, abdominal bloating, constipation, diarrhea, melena and hematochezia. Current treatment includes: proton pump inhibitor- Prevacid 15 mg qd. Medication side effects:  none. Recent diagnostic testing: none.       Mood Disorder:  Patient presents for follow-up of depression. Current complaints include: none. She denies anhedonia, depressed mood, tearfulness, feelings of hopelessness, insomnia, difficulty concentrating, irritability and excessive worry. Symptoms/signs of lili: none. External stressors: nothing new. Current treatment includes: Celexa- 10 mg qd. Medication side effects: none. Osteoporosis/osteopenia:  Current pharmacologic therapy and date started Evista (raloxifene)- 2012. Medication side effects: none. Last DXA:  8/18-  T score at spine -2.0 and lowest hip -1.9, which was worse compared to previous scan. Current calcium intake is at least 1200 mg/day from diet and supplements: yes. She is currently taking 2000 IU/day of supplemental vitamin D. Regular weight-bearing exercise: no, but planning to start working with .     Lab Results   Component Value Date    LDLCALC 145 (H) 09/21/2017    LDLCALC 142 (H) 01/31/2017    TRIG 122 09/21/2017    HDL 62 09/21/2017     Lab Results   Component Value Date    GLUF 83 03/20/2018    GLUCOSE 86 09/21/2017     Lab Results   Component Value Date     03/20/2018    K 4.3 03/20/2018    BUN 10 03/20/2018    CREATININE <0.5 (L) 03/20/2018    LABGLOM >60 03/20/2018    GFRAA >60 03/20/2018    CALCIUM 9.1 03/20/2018    VITD25 36.5 03/20/2018     Lab Results   Component Value Date    ALT 20 03/20/2018    AST 23 03/20/2018     Lab Results   Component Value Date    HGB 12.5 03/20/2018     Lab Results   Component

## 2018-10-14 RX ORDER — METOPROLOL SUCCINATE 25 MG/1
TABLET, EXTENDED RELEASE ORAL
Qty: 30 TABLET | Refills: 5 | Status: SHIPPED | OUTPATIENT
Start: 2018-10-14 | End: 2019-03-25 | Stop reason: SDUPTHER

## 2019-02-15 RX ORDER — SUMATRIPTAN 50 MG/1
TABLET, FILM COATED ORAL
Qty: 12 TABLET | Refills: 0 | Status: SHIPPED | OUTPATIENT
Start: 2019-02-15 | End: 2019-03-19 | Stop reason: SDUPTHER

## 2019-03-13 RX ORDER — CITALOPRAM 10 MG/1
TABLET ORAL
Qty: 30 TABLET | Refills: 4 | Status: SHIPPED | OUTPATIENT
Start: 2019-03-13 | End: 2019-10-14 | Stop reason: SDUPTHER

## 2019-03-17 RX ORDER — RALOXIFENE HYDROCHLORIDE 60 MG/1
TABLET, FILM COATED ORAL
Qty: 30 TABLET | Refills: 5 | Status: SHIPPED | OUTPATIENT
Start: 2019-03-17 | End: 2019-09-16 | Stop reason: SDUPTHER

## 2019-03-19 ENCOUNTER — OFFICE VISIT (OUTPATIENT)
Dept: INTERNAL MEDICINE CLINIC | Age: 69
End: 2019-03-19
Payer: MEDICARE

## 2019-03-19 VITALS
SYSTOLIC BLOOD PRESSURE: 130 MMHG | WEIGHT: 156 LBS | DIASTOLIC BLOOD PRESSURE: 76 MMHG | OXYGEN SATURATION: 98 % | HEART RATE: 78 BPM | BODY MASS INDEX: 26.36 KG/M2

## 2019-03-19 DIAGNOSIS — E78.00 PURE HYPERCHOLESTEROLEMIA: ICD-10-CM

## 2019-03-19 DIAGNOSIS — E03.4 HYPOTHYROIDISM DUE TO ACQUIRED ATROPHY OF THYROID: ICD-10-CM

## 2019-03-19 DIAGNOSIS — I10 ESSENTIAL HYPERTENSION: Primary | ICD-10-CM

## 2019-03-19 DIAGNOSIS — K44.9 HIATAL HERNIA: Chronic | ICD-10-CM

## 2019-03-19 DIAGNOSIS — F32.5 MAJOR DEPRESSIVE DISORDER WITH SINGLE EPISODE, IN FULL REMISSION (HCC): Chronic | ICD-10-CM

## 2019-03-19 PROCEDURE — 99214 OFFICE O/P EST MOD 30 MIN: CPT | Performed by: INTERNAL MEDICINE

## 2019-03-19 PROCEDURE — G8510 SCR DEP NEG, NO PLAN REQD: HCPCS | Performed by: INTERNAL MEDICINE

## 2019-03-19 RX ORDER — ONDANSETRON 4 MG/1
4 TABLET, FILM COATED ORAL DAILY PRN
Qty: 30 TABLET | Refills: 1 | Status: SHIPPED | OUTPATIENT
Start: 2019-03-19 | End: 2019-09-03 | Stop reason: SDUPTHER

## 2019-03-19 RX ORDER — CANDESARTAN 16 MG/1
TABLET ORAL
Qty: 90 TABLET | Refills: 1 | Status: SHIPPED | OUTPATIENT
Start: 2019-03-19 | End: 2019-09-16 | Stop reason: SDUPTHER

## 2019-03-19 ASSESSMENT — PATIENT HEALTH QUESTIONNAIRE - PHQ9
2. FEELING DOWN, DEPRESSED OR HOPELESS: 0
SUM OF ALL RESPONSES TO PHQ QUESTIONS 1-9: 0
1. LITTLE INTEREST OR PLEASURE IN DOING THINGS: 0
SUM OF ALL RESPONSES TO PHQ9 QUESTIONS 1 & 2: 0
SUM OF ALL RESPONSES TO PHQ QUESTIONS 1-9: 0

## 2019-03-25 RX ORDER — METOPROLOL SUCCINATE 25 MG/1
TABLET, EXTENDED RELEASE ORAL
Qty: 90 TABLET | Refills: 1 | Status: SHIPPED | OUTPATIENT
Start: 2019-03-25 | End: 2019-12-26

## 2019-03-26 RX ORDER — LEVOTHYROXINE SODIUM 0.12 MG/1
TABLET ORAL
Qty: 30 TABLET | Refills: 5 | Status: SHIPPED | OUTPATIENT
Start: 2019-03-26 | End: 2019-09-29 | Stop reason: SDUPTHER

## 2019-04-16 RX ORDER — SUMATRIPTAN 50 MG/1
TABLET, FILM COATED ORAL
Qty: 12 TABLET | Refills: 1 | Status: SHIPPED | OUTPATIENT
Start: 2019-04-16 | End: 2020-05-05

## 2019-05-20 ENCOUNTER — TELEPHONE (OUTPATIENT)
Dept: INTERNAL MEDICINE CLINIC | Age: 69
End: 2019-05-20

## 2019-05-20 NOTE — TELEPHONE ENCOUNTER
Patient is experiencing Indigestion x 1 month and is requesting an appointment with Dr. Ki Evans. It was suggested that she could possibly be seen by another provider today but patient refuled. Please contact patient at the number provided to advise.

## 2019-05-21 ENCOUNTER — OFFICE VISIT (OUTPATIENT)
Dept: INTERNAL MEDICINE CLINIC | Age: 69
End: 2019-05-21
Payer: MEDICARE

## 2019-05-21 VITALS
DIASTOLIC BLOOD PRESSURE: 68 MMHG | HEART RATE: 65 BPM | WEIGHT: 156 LBS | SYSTOLIC BLOOD PRESSURE: 120 MMHG | OXYGEN SATURATION: 95 % | BODY MASS INDEX: 26.36 KG/M2

## 2019-05-21 DIAGNOSIS — R10.13 EPIGASTRIC PAIN: ICD-10-CM

## 2019-05-21 PROCEDURE — 99214 OFFICE O/P EST MOD 30 MIN: CPT | Performed by: INTERNAL MEDICINE

## 2019-05-21 NOTE — PROGRESS NOTES
Assessment/Plan     1. Epigastric pain  Suspect related to slower motility secondary to chronic use of Benadryl, exacerbation of GERD. She will use topical steroid instead of Benadryl for her chronic LE rash, continue daily Prevacid, and try to back off on the Zofran. If bowel habits do not return to normal within 1-2 weeks, she will start daily Miralax. GI referral provided since overdue for colonoscopy and needs EGD for Byrd's surveillance. Patient will call if symptoms change or worsen. - Nanda Umanzor MD, Gastroenterology, [facility]   YOB: 1950    Date of Visit:  5/21/2019    Allergies   Allergen Reactions    Cephalexin Hives      Prior to Visit Medications    Medication Sig Taking? Authorizing Provider   cimetidine (TAGAMET HB) 200 MG tablet Take 200 mg by mouth 4 times daily Yes Historical Provider, MD   SUMAtriptan (IMITREX) 50 MG tablet TAKE ONE TABLET BY MOUTH AT ONSET OF HEADACHE; MAY REPEAT ONE TABLET IN 2 HOURS IF NEEDED.  Yes Brenna Maloney MD   levothyroxine (SYNTHROID) 125 MCG tablet TAKE ONE TABLET BY MOUTH DAILY Yes Brenna Maloney MD   metoprolol succinate (TOPROL XL) 25 MG extended release tablet TAKE ONE TABLET BY MOUTH DAILY Yes Brenna Maloney MD   fluticasone (FLONASE) 50 MCG/ACT nasal spray SPRAY ONE SPRAY IN EACH NOSTRIL ONCE DAILY Yes Brenna Maloney MD   candesartan (ATACAND) 16 MG tablet TAKE ONE TABLET BY MOUTH DAILY Yes Brenna Maloney MD   ondansetron (ZOFRAN) 4 MG tablet Take 1 tablet by mouth daily as needed for Nausea or Vomiting Yes Brenna Maloney MD   raloxifene (EVISTA) 60 MG tablet TAKE ONE TABLET BY MOUTH DAILY Yes Brenna Maloney MD   citalopram (CELEXA) 10 MG tablet TAKE ONE TABLET BY MOUTH DAILY Yes Brenna Maloney MD   Aspirin-Acetaminophen-Caffeine (EXCEDRIN MIGRAINE PO) Take by mouth Yes Historical Provider, MD   calcium carbonate (OSCAL) 500 MG TABS tablet Take 500 mg by mouth daily Yes Oropharynx is clear and moist and mucous membranes are normal. No oropharyngeal exudate or posterior oropharyngeal erythema. Eyes: No scleral icterus. Cardiovascular: Normal rate, regular rhythm and normal heart sounds. Exam reveals no gallop and no friction rub. No murmur heard. Pulmonary/Chest: Effort normal and breath sounds normal. No respiratory distress. She has no decreased breath sounds. She has no wheezes. She has no rhonchi. She has no rales. She exhibits no tenderness. Abdominal: Soft. Bowel sounds are normal. She exhibits no distension and no mass. There is tenderness (epigastric- mild). There is no rebound and no guarding. Musculoskeletal: She exhibits no edema or tenderness. Lymphadenopathy:     She has no cervical adenopathy. Neurological: She is alert and oriented to person, place, and time. Skin: Skin is warm and dry. No rash noted. No erythema. No jaundice   Psychiatric: She has a normal mood and affect.  Her behavior is normal.

## 2019-09-03 RX ORDER — ONDANSETRON 4 MG/1
TABLET, FILM COATED ORAL
Qty: 30 TABLET | Refills: 3 | Status: SHIPPED | OUTPATIENT
Start: 2019-09-03 | End: 2020-09-21

## 2019-09-16 RX ORDER — RALOXIFENE HYDROCHLORIDE 60 MG/1
TABLET, FILM COATED ORAL
Qty: 30 TABLET | Refills: 5 | Status: SHIPPED | OUTPATIENT
Start: 2019-09-16 | End: 2020-03-12

## 2019-09-16 RX ORDER — CANDESARTAN 16 MG/1
TABLET ORAL
Qty: 90 TABLET | Refills: 5 | Status: SHIPPED | OUTPATIENT
Start: 2019-09-16 | End: 2020-03-19 | Stop reason: SDUPTHER

## 2019-09-24 ENCOUNTER — OFFICE VISIT (OUTPATIENT)
Dept: INTERNAL MEDICINE CLINIC | Age: 69
End: 2019-09-24
Payer: MEDICARE

## 2019-09-24 VITALS
OXYGEN SATURATION: 99 % | BODY MASS INDEX: 26.12 KG/M2 | WEIGHT: 153 LBS | HEIGHT: 64 IN | DIASTOLIC BLOOD PRESSURE: 72 MMHG | HEART RATE: 64 BPM | SYSTOLIC BLOOD PRESSURE: 128 MMHG

## 2019-09-24 DIAGNOSIS — I10 ESSENTIAL HYPERTENSION: ICD-10-CM

## 2019-09-24 DIAGNOSIS — E78.00 PURE HYPERCHOLESTEROLEMIA: ICD-10-CM

## 2019-09-24 DIAGNOSIS — Z00.00 ROUTINE GENERAL MEDICAL EXAMINATION AT A HEALTH CARE FACILITY: Primary | ICD-10-CM

## 2019-09-24 DIAGNOSIS — R68.2 DRY MOUTH: ICD-10-CM

## 2019-09-24 DIAGNOSIS — E03.4 HYPOTHYROIDISM DUE TO ACQUIRED ATROPHY OF THYROID: ICD-10-CM

## 2019-09-24 DIAGNOSIS — K44.9 HIATAL HERNIA: Chronic | ICD-10-CM

## 2019-09-24 DIAGNOSIS — R53.82 CHRONIC FATIGUE: ICD-10-CM

## 2019-09-24 PROCEDURE — 99213 OFFICE O/P EST LOW 20 MIN: CPT | Performed by: INTERNAL MEDICINE

## 2019-09-24 PROCEDURE — G0438 PPPS, INITIAL VISIT: HCPCS | Performed by: INTERNAL MEDICINE

## 2019-09-24 ASSESSMENT — PATIENT HEALTH QUESTIONNAIRE - PHQ9
SUM OF ALL RESPONSES TO PHQ QUESTIONS 1-9: 0
SUM OF ALL RESPONSES TO PHQ QUESTIONS 1-9: 0

## 2019-09-24 ASSESSMENT — LIFESTYLE VARIABLES: HOW OFTEN DO YOU HAVE A DRINK CONTAINING ALCOHOL: 0

## 2019-09-24 NOTE — PROGRESS NOTES
Assessment/Plan     1. Routine general medical examination at a health care facility  See separate AWV note    2. Essential hypertension  Well-controlled. Continue current medications. - Comprehensive Metabolic Panel, Fasting; Future    3. Pure hypercholesterolemia  Importance of continued lifestyle changes stressed to help prevent need for cholesterol medication in the future. Has been intolerant to Lipitor in the past.  - Lipid, Fasting; Future    4. Hypothyroidism due to acquired atrophy of thyroid  Fatigue, hair loss, dry eyes and dry mouth are likely multifactorial, but will adjust levothyroxine dose if indicated by lab results.  - TSH without Reflex; Future    5. Hiatal hernia  Dyspepsia and nausea improved with discontinuation and Benadryl and better control of constipation with prn Miralax, but still has intermittent persistent symptoms. She will try switching from Prevacid to Zantac, and follow up with Dr. Atif Tsang. 6. Dry mouth  Suspect Sjogren's syndrome- if labs suggestive, will refer to rheumatology. If evaluation negative, she will follow up with eye specialist and dentist for further evaluation and treatment of her dry mouth/dry eye symptoms.  - C-Reactive Protein; Future  - Sedimentation Rate; Future  - PATRICK Reflex to Antibody Wilson; Future    7. Chronic fatigue  May be related to above. Self-care measures discussed. - CBC Auto Differential; Future  - Vitamin B12; Future            Return in 6 months (on 3/24/2020). Shaylee Donovan   YOB: 1950    Date of Visit:  9/24/2019    Allergies   Allergen Reactions    Cephalexin Hives      Prior to Visit Medications    Medication Sig Taking?  Authorizing Provider   raloxifene (EVISTA) 60 MG tablet TAKE ONE TABLET BY MOUTH DAILY Yes Joanne Yates MD   candesartan (ATACAND) 16 MG tablet TAKE ONE TABLET BY MOUTH DAILY Yes Joanne Yates MD   ondansetron (ZOFRAN) 4 MG tablet TAKE ONE TABLET BY MOUTH DAILY AS NEEDED FOR Constitutional: She is oriented to person, place, and time. She appears well-developed and well-nourished. No distress. HENT:   Mouth/Throat: Mucous membranes are normal.   Oral cavity clear, but dry   Eyes: Conjunctivae are normal.   Neck: Carotid bruit is not present. No thyroid mass and no thyromegaly present. Cardiovascular: Normal rate, regular rhythm, normal heart sounds, intact distal pulses and normal pulses. Exam reveals no gallop and no friction rub. No murmur heard. Pulmonary/Chest: Effort normal and breath sounds normal. No respiratory distress. She has no wheezes. She has no rhonchi. She has no rales. Abdominal: Soft. She exhibits no distension. There is no tenderness. Musculoskeletal: She exhibits no edema. Lymphadenopathy:     She has no cervical adenopathy. Neurological: She is alert and oriented to person, place, and time. Skin: Skin is warm, dry and intact. Psychiatric: She has a normal mood and affect.  Her speech is normal and behavior is normal. Judgment and thought content normal. Cognition and memory are normal.

## 2019-09-24 NOTE — PROGRESS NOTES
Medicare Annual Wellness Visit  Name: Regan Postin Date: 2019   MRN: [id number] Sex: Female   Age: 78 y.o. Ethnicity: Non-/Non    : 1950 Race: Ernestina Crane is here for Medicare AWV    Screenings for behavioral, psychosocial and functional/safety risks, and cognitive dysfunction are all negative except as indicated below. These results, as well as other patient data from the [address] form, are documented in Flowsheets linked to this Encounter. Allergies   Allergen Reactions    Cephalexin Hives     Prior to Visit Medications    Medication Sig Taking? Authorizing Provider   raloxifene (EVISTA) 60 MG tablet TAKE ONE TABLET BY MOUTH DAILY Yes Demetria Wakefield MD   candesartan (ATACAND) 16 MG tablet TAKE ONE TABLET BY MOUTH DAILY Yes Demetria Wakefield MD   ondansetron (ZOFRAN) 4 MG tablet TAKE ONE TABLET BY MOUTH DAILY AS NEEDED FOR NAUSEA OR VOMITING Yes Demetria Wakefield MD   SUMAtriptan (IMITREX) 50 MG tablet TAKE ONE TABLET BY MOUTH AT ONSET OF HEADACHE; MAY REPEAT ONE TABLET IN 2 HOURS IF NEEDED. Yes Demetria Wakefield MD   levothyroxine (SYNTHROID) 125 MCG tablet TAKE ONE TABLET BY MOUTH DAILY Yes Demetria Wakefield MD   metoprolol succinate (TOPROL XL) 25 MG extended release tablet TAKE ONE TABLET BY MOUTH DAILY Yes Demetria Wakefield MD   fluticasone (FLONASE) 50 MCG/ACT nasal spray SPRAY ONE SPRAY IN EACH NOSTRIL ONCE DAILY Yes Demetria Wakefield MD   citalopram (CELEXA) 10 MG tablet TAKE ONE TABLET BY MOUTH DAILY Yes Demetria Wakefield MD   Aspirin-Acetaminophen-Caffeine (EXCEDRIN MIGRAINE PO) Take by mouth Yes Historical Provider, MD   calcium carbonate (OSCAL) 500 MG TABS tablet Take 500 mg by mouth daily Yes Historical Provider, MD   Lansoprazole (PREVACID PO) Take 15 mg by mouth every 48 hours  Yes Historical Provider, MD   Misc Intestinal Lou Regulat (ALIGN PO) Take  by mouth.  Yes Historical Provider, MD   cimetidine (TAGAMET HB) 200 MG tablet Screenings with Interventions:     Safety:  Safety  Do you have working smoke detectors?: Yes  Have all throw rugs been removed or fastened?: (!) No  Do you have non-slip mats or surfaces in all bathtubs/showers?: (!) No  Do all of your stairways have a railing or banister?: Yes  Are your doorways, halls and stairs free of clutter?: Yes  Do you always fasten your seatbelt when you are in a car?: Yes  Safety Interventions:  · Home safety tips provided    Personalized Preventive Plan   Current Health Maintenance Status  Immunization History   Administered Date(s) Administered    Influenza Vaccine, unspecified formulation 09/23/2015    Influenza Virus Vaccine 09/20/2011, 10/12/2012, 09/30/2014, 09/19/2016    Influenza Whole 09/27/2010    Influenza, High Dose (Fluzone 65 yrs and older) 09/14/2017, 09/20/2018, 09/07/2019    MMR 06/10/2014    Pneumococcal Conjugate 13-valent ([id number]) 11/18/2014    Pneumococcal Polysaccharide ([id number]) 07/07/2016    Td, unspecified formulation 06/17/2004    Tdap (Boostrix, Adacel) 06/05/2014    Zoster Live (Zostavax) 01/20/2012        Health Maintenance   Topic Date Due    Shingles Vaccine (2 of 3) 03/16/2012    Annual Wellness Visit (AWV)  05/29/2019    Potassium monitoring  03/29/2020    Creatinine monitoring  03/29/2020    Breast cancer screen  08/29/2020    Lipid screen  03/29/2024    DTaP/Tdap/Td vaccine (2 - Td) 06/05/2024    Colon cancer screen colonoscopy  07/30/2029    DEXA (modify frequency per FRAX score)  Completed    Flu vaccine  Completed    Pneumococcal 65+ years Vaccine  Completed    Hepatitis C screen  Completed     Recommendations for Preventive Services Due: see orders and patient instructions/AVS.  . Recommended screening schedule for the next 5-10 years is provided to the patient in written form: see Patient Instructions/AVS.    Vitaly Ramsey was seen today for medicare awv.     Diagnoses and all orders for this visit:    Routine general medical examination at a health care facility  - [facility] when available

## 2019-09-24 NOTE — PATIENT INSTRUCTIONS
Personalized Preventive Plan for Ada Lien - 9/24/2019  Medicare offers a range of preventive health benefits. Some of the tests and screenings are paid in full while other may be subject to a deductible, co-insurance, and/or copay. Some of these benefits include a comprehensive review of your medical history including lifestyle, illnesses that may run in your family, and various assessments and screenings as appropriate. After reviewing your medical record and screening and assessments performed today your provider may have ordered immunizations, labs, imaging, and/or referrals for you. A list of these orders (if applicable) as well as your Preventive Care list are included within your After Visit Summary for your review. Other Preventive Recommendations:    · A preventive eye exam performed by an eye specialist is recommended every 1-2 years to screen for glaucoma; cataracts, macular degeneration, and other eye disorders. · A preventive dental visit is recommended every 6 months. · Try to get at least 150 minutes of exercise per week or 10,000 steps per day on a pedometer . · Order or download the FREE \"Exercise & Physical Activity: Your Everyday Guide\" from The [employer]. Call 4-351.457.2762 or search The [employer] online. · You need 7576-9082 mg of calcium and 1365-9196 IU of vitamin D per day. It is possible to meet your calcium requirement with diet alone, but a vitamin D supplement is usually necessary to meet this goal.  · When exposed to the sun, use a sunscreen that protects against both UVA and UVB radiation with an SPF of 30 or greater. Reapply every 2 to 3 hours or after sweating, drying off with a towel, or swimming. · Always wear a seat belt when traveling in a car. Always wear a helmet when riding a bicycle or motorcycle.     Keeping Home a [facility]       As we get older, changes in balance, gait, strength, vision, hearing, and cognition

## 2019-09-26 ENCOUNTER — HOSPITAL ENCOUNTER (OUTPATIENT)
Dept: WOMENS IMAGING | Age: 69
Discharge: HOME | End: 2019-09-26
Payer: MEDICARE

## 2019-09-26 DIAGNOSIS — Z12.39 BREAST CANCER SCREENING: ICD-10-CM

## 2019-09-26 PROCEDURE — 77067 SCR MAMMO BI INCL CAD: CPT

## 2019-09-30 RX ORDER — LEVOTHYROXINE SODIUM 0.12 MG/1
TABLET ORAL
Qty: 30 TABLET | Refills: 5 | Status: SHIPPED | OUTPATIENT
Start: 2019-09-30 | End: 2020-04-01

## 2019-10-14 RX ORDER — CITALOPRAM 10 MG/1
TABLET ORAL
Qty: 30 TABLET | Refills: 5 | Status: SHIPPED | OUTPATIENT
Start: 2019-10-14 | End: 2020-04-09

## 2019-12-02 ENCOUNTER — OFFICE VISIT (OUTPATIENT)
Dept: DERMATOLOGY | Age: 69
End: 2019-12-02
Payer: MEDICARE

## 2019-12-02 DIAGNOSIS — L81.4 SOLAR LENTIGO: ICD-10-CM

## 2019-12-02 DIAGNOSIS — L30.9 CHRONIC DERMATITIS: Primary | ICD-10-CM

## 2019-12-02 DIAGNOSIS — L82.1 SK (SEBORRHEIC KERATOSIS): ICD-10-CM

## 2019-12-02 PROCEDURE — 99213 OFFICE O/P EST LOW 20 MIN: CPT | Performed by: DERMATOLOGY

## 2019-12-02 RX ORDER — CLOBETASOL PROPIONATE 0.5 MG/G
CREAM TOPICAL
Qty: 60 G | Refills: 1 | Status: SHIPPED | OUTPATIENT
Start: 2019-12-02 | End: 2022-02-16 | Stop reason: SDUPTHER

## 2019-12-26 RX ORDER — METOPROLOL SUCCINATE 25 MG/1
TABLET, EXTENDED RELEASE ORAL
Qty: 90 TABLET | Refills: 1 | Status: SHIPPED | OUTPATIENT
Start: 2019-12-26 | End: 2020-06-22

## 2020-01-06 ENCOUNTER — TELEPHONE (OUTPATIENT)
Dept: INTERNAL MEDICINE CLINIC | Age: 70
End: 2020-01-06

## 2020-03-12 RX ORDER — RALOXIFENE HYDROCHLORIDE 60 MG/1
TABLET, FILM COATED ORAL
Qty: 30 TABLET | Refills: 5 | Status: SHIPPED | OUTPATIENT
Start: 2020-03-12 | End: 2020-09-13

## 2020-03-19 RX ORDER — CANDESARTAN 16 MG/1
TABLET ORAL
Qty: 90 TABLET | Refills: 1 | Status: SHIPPED | OUTPATIENT
Start: 2020-03-19 | End: 2020-12-23 | Stop reason: SDUPTHER

## 2020-03-19 RX ORDER — CANDESARTAN 16 MG/1
TABLET ORAL
Qty: 90 TABLET | Refills: 1 | Status: CANCELLED | OUTPATIENT
Start: 2020-03-19

## 2020-04-01 RX ORDER — LEVOTHYROXINE SODIUM 0.12 MG/1
TABLET ORAL
Qty: 30 TABLET | Refills: 4 | Status: SHIPPED | OUTPATIENT
Start: 2020-04-01 | End: 2020-08-31

## 2020-04-09 RX ORDER — CITALOPRAM 10 MG/1
TABLET ORAL
Qty: 30 TABLET | Refills: 5 | Status: SHIPPED | OUTPATIENT
Start: 2020-04-09 | End: 2020-10-06

## 2020-04-27 RX ORDER — FLUTICASONE PROPIONATE 50 MCG
SPRAY, SUSPENSION (ML) NASAL
Qty: 1 BOTTLE | Refills: 11 | Status: SHIPPED | OUTPATIENT
Start: 2020-04-27 | End: 2021-05-05

## 2020-04-28 ENCOUNTER — TELEMEDICINE (OUTPATIENT)
Dept: INTERNAL MEDICINE CLINIC | Age: 70
End: 2020-04-28
Payer: MEDICARE

## 2020-04-28 VITALS
WEIGHT: 150.2 LBS | DIASTOLIC BLOOD PRESSURE: 70 MMHG | BODY MASS INDEX: 25.58 KG/M2 | HEART RATE: 73 BPM | SYSTOLIC BLOOD PRESSURE: 141 MMHG | TEMPERATURE: 97.5 F

## 2020-04-28 PROCEDURE — 99214 OFFICE O/P EST MOD 30 MIN: CPT | Performed by: INTERNAL MEDICINE

## 2020-04-28 RX ORDER — POLYETHYLENE GLYCOL 3350 17 G/17G
17 POWDER, FOR SOLUTION ORAL EVERY OTHER DAY
COMMUNITY

## 2020-05-05 RX ORDER — SUMATRIPTAN 50 MG/1
TABLET, FILM COATED ORAL
Qty: 12 TABLET | Refills: 0 | Status: SHIPPED | OUTPATIENT
Start: 2020-05-05 | End: 2020-11-09

## 2020-05-05 NOTE — TELEPHONE ENCOUNTER
Last appointment: 4/28/2020  Next appointment: Visit date not found  Last refill: 4/16/2019    Disposition states to return around 10/28/2020.

## 2020-06-22 RX ORDER — METOPROLOL SUCCINATE 25 MG/1
TABLET, EXTENDED RELEASE ORAL
Qty: 90 TABLET | Refills: 1 | Status: SHIPPED | OUTPATIENT
Start: 2020-06-22 | End: 2020-12-18

## 2020-08-31 RX ORDER — LEVOTHYROXINE SODIUM 0.12 MG/1
TABLET ORAL
Qty: 30 TABLET | Refills: 5 | Status: SHIPPED | OUTPATIENT
Start: 2020-08-31 | End: 2021-02-24

## 2020-09-02 ENCOUNTER — HOSPITAL ENCOUNTER (OUTPATIENT)
Dept: GENERAL RADIOLOGY | Age: 70
Discharge: HOME | End: 2020-09-02
Payer: MEDICARE

## 2020-09-02 PROCEDURE — 77080 DXA BONE DENSITY AXIAL: CPT

## 2020-09-13 RX ORDER — RALOXIFENE HYDROCHLORIDE 60 MG/1
TABLET, FILM COATED ORAL
Qty: 30 TABLET | Refills: 4 | Status: SHIPPED | OUTPATIENT
Start: 2020-09-13 | End: 2020-10-29 | Stop reason: ALTCHOICE

## 2020-09-21 RX ORDER — ONDANSETRON 4 MG/1
TABLET, FILM COATED ORAL
Qty: 30 TABLET | Refills: 2 | Status: SHIPPED | OUTPATIENT
Start: 2020-09-21 | End: 2021-04-14

## 2020-10-06 RX ORDER — CITALOPRAM 10 MG/1
TABLET ORAL
Qty: 30 TABLET | Refills: 5 | Status: SHIPPED | OUTPATIENT
Start: 2020-10-06 | End: 2021-04-06

## 2020-10-27 ENCOUNTER — HOSPITAL ENCOUNTER (OUTPATIENT)
Dept: WOMENS IMAGING | Age: 70
Discharge: HOME | End: 2020-10-27
Payer: MEDICARE

## 2020-10-27 PROCEDURE — 77063 BREAST TOMOSYNTHESIS BI: CPT

## 2020-10-29 ENCOUNTER — OFFICE VISIT (OUTPATIENT)
Dept: INTERNAL MEDICINE CLINIC | Age: 70
End: 2020-10-29
Payer: MEDICARE

## 2020-10-29 VITALS
BODY MASS INDEX: 26.12 KG/M2 | HEART RATE: 74 BPM | HEIGHT: 64 IN | TEMPERATURE: 97.4 F | SYSTOLIC BLOOD PRESSURE: 118 MMHG | DIASTOLIC BLOOD PRESSURE: 70 MMHG | WEIGHT: 153 LBS

## 2020-10-29 PROCEDURE — G0439 PPPS, SUBSEQ VISIT: HCPCS | Performed by: INTERNAL MEDICINE

## 2020-10-29 ASSESSMENT — PATIENT HEALTH QUESTIONNAIRE - PHQ9
1. LITTLE INTEREST OR PLEASURE IN DOING THINGS: 0
SUM OF ALL RESPONSES TO PHQ QUESTIONS 1-9: 0
2. FEELING DOWN, DEPRESSED OR HOPELESS: 0
SUM OF ALL RESPONSES TO PHQ QUESTIONS 1-9: 0
SUM OF ALL RESPONSES TO PHQ QUESTIONS 1-9: 0
SUM OF ALL RESPONSES TO PHQ9 QUESTIONS 1 & 2: 0

## 2020-10-29 ASSESSMENT — LIFESTYLE VARIABLES: HOW OFTEN DO YOU HAVE A DRINK CONTAINING ALCOHOL: 0

## 2020-10-29 NOTE — PROGRESS NOTES
Medicare Annual Wellness Visit  Name: Naz Rosado Date: 10/29/2020   MRN: [id number] Sex: Female   Age: 69 y.o. Ethnicity: Non-/Non    : 1950 Race: Dauna Paget Glatfelter is here for Medicare AWV    Screenings for behavioral, psychosocial and functional/safety risks, and cognitive dysfunction are all negative except as indicated below. These results, as well as other patient data from the [address] form, are documented in Flowsheets linked to this Encounter. Allergies   Allergen Reactions    Cephalexin Hives       Prior to Visit Medications    Medication Sig Taking? Authorizing Provider   Calcium Citrate-Vitamin D (CALCIUM + D PO) Take by mouth Yes Historical Provider, MD   citalopram (CELEXA) 10 MG tablet TAKE ONE TABLET BY MOUTH DAILY Yes Cali Yo MD   ondansetron (ZOFRAN) 4 MG tablet TAKE ONE TABLET BY MOUTH DAILY AS NEEDED OR VOMITING Yes Cali Yo MD   raloxifene (EVISTA) 60 MG tablet TAKE ONE TABLET BY MOUTH DAILY Yes Cali Yo MD   levothyroxine (SYNTHROID) 125 MCG tablet TAKE ONE TABLET BY MOUTH DAILY Yes Cali Yo MD   metoprolol succinate (TOPROL XL) 25 MG extended release tablet TAKE ONE TABLET BY MOUTH DAILY Yes Cali Yo MD   SUMAtriptan (IMITREX) 50 MG tablet TAKE ONE TABLET BY MOUTH AT ONSET OF HEADACHE; MAY REPEAT ONE TABLET IN 2 HOURS IF NEEDED. Yes Cali Yo MD   fluticasone (FLONASE) 50 MCG/ACT nasal spray SPRAY ONE SPRAY IN EACH NOSTRIL ONCE DAILY Yes Cali Yo MD   candesartan (ATACAND) 16 MG tablet TAKE ONE TABLET BY MOUTH DAILY Yes Cali Yo MD   Lansoprazole (PREVACID PO) Take 15 mg by mouth every 48 hours  Yes Historical Provider, MD   Misc Intestinal Lou Regulat (ALIGN PO) Take  by mouth.  Yes Historical Provider, MD   polyethylene glycol (GLYCOLAX) 17 g packet Take 17 g by mouth every other day  Historical Provider, MD   clobetasol (TEMOVATE) 0.05 % cream Apply to affected areas on the legs twice daily if needed for recurrence of dermatitis. Patient not taking: Reported on 4/28/2020  Jorgito Armando MD   Aspirin-Acetaminophen-Caffeine (EXCEDRIN MIGRAINE PO) Take by mouth  Historical Provider, MD       Past Medical History:   Diagnosis Date    Allergic rhinitis     Anxiety     Depression     Hiatal hernia     Hyperlipidemia     Hypertension     Hypothyroidism     Migraine headache     Osteoporosis     Overactive bladder     Rupture of tendon of foot region     Right    Spondyloarthropathy     L5-S1       Past Surgical History:   Procedure Laterality Date    ANKLE SURGERY  1/17/11    Right gastroc lenghtening, proximal tibial bone graft triple arthrodesis of ankle    DILATION AND CURETTAGE OF UTERUS      x2    KNEE ARTHROSCOPY Left 1/28/15    TEAR DUCT SURGERY  2002       Family History   Problem Relation Age of Onset    Ovarian Cancer Mother         Age 48    Hypertension Father     Thyroid Disease Father     Diabetes Maternal Grandfather         Type I       CareTeam (Including outside providers/suppliers regularly involved in providing care):   Patient Care Team:  Tremayne Roche MD as PCP - General (Internal Medicine)  Taylor Oneil DO as PCP - Hematology/Oncology (Hematology and Oncology)  Tremayne Roche MD as PCP - [facility] Empaneled Provider    Wt Readings from Last 3 Encounters:   10/29/20 153 lb (69.4 kg)   04/28/20 150 lb 3.2 oz (68.1 kg)   09/24/19 153 lb (69.4 kg)     Vitals:    10/29/20 1008   BP: 118/70   Site: Right Upper Arm   Position: Sitting   Cuff Size: Medium Adult   Pulse: 74   Temp: 97.4 °F (36.3 °C)   Weight: 153 lb (69.4 kg)   Height: 5' 3.5\" (1.613 m)     Body mass index is 26.68 kg/m². Based upon direct observation of the patient, evaluation of cognition reveals recent and remote memory intact. Physical Exam  Constitutional:       General: She is not in acute distress. Appearance: She is well-developed.    HENT: Mouth/Throat:      Pharynx: No oropharyngeal exudate or posterior oropharyngeal erythema. Eyes:      General: No scleral icterus. Cardiovascular:      Rate and Rhythm: Normal rate and regular rhythm. Heart sounds: Normal heart sounds. No murmur. No friction rub. No gallop. Pulmonary:      Effort: Pulmonary effort is normal. No respiratory distress. Breath sounds: Normal breath sounds. No decreased breath sounds, wheezing, rhonchi or rales. Chest:      Chest wall: No tenderness. Abdominal:      General: Bowel sounds are normal. There is no distension. Palpations: Abdomen is soft. There is no mass. Tenderness: There is abdominal tenderness (epigastric- mild). There is no guarding or rebound. Musculoskeletal:         General: No tenderness. Lymphadenopathy:      Cervical: No cervical adenopathy. Skin:     General: Skin is warm and dry. Findings: No erythema or rash. Neurological:      Mental Status: She is alert and oriented to person, place, and time. Psychiatric:         Behavior: Behavior normal.        Patient's complete Health Risk Assessment and screening values have been reviewed and are found in Flowsheets. The following problems were reviewed today and where indicated follow up appointments were made and/or referrals ordered. Positive Risk Factor Screenings with Interventions:     General Health and ACP:  General  In general, how would you say your health is?: Very Good  In the past 7 days, have you experienced any of the following?  New or Increased Pain, New or Increased Fatigue, Loneliness, Social Isolation, Stress or Anger?: (!) Stress  Do you get the social and emotional support that you need?: Yes  Do you have a Living Will?: Yes  Advance Directives     Power of  Living Will ACP-Advance Directive ACP-Power of     Not on File Not on File Not on File Not on File      General Health Risk Interventions:  · Stress: about election- no intervention needed    Personalized Preventive Plan   Current Health Maintenance Status  Immunization History   Administered Date(s) Administered    Influenza Vaccine, unspecified formulation 09/23/2015    Influenza Virus Vaccine 09/20/2011, 10/12/2012, 09/30/2014, 09/19/2016    Influenza Whole 09/27/2010    Influenza, High Dose (Fluzone 65 yrs and older) 09/14/2017, 09/20/2018, 09/07/2019, 09/24/2020    MMR 06/10/2014    Pneumococcal Conjugate 13-valent ([id number]) 11/18/2014    Pneumococcal Polysaccharide (Lprogouxv65) 07/07/2016    Td, unspecified formulation 06/17/2004    Tdap (Boostrix, Adacel) 06/05/2014    Zoster Live (Zostavax) 01/20/2012        Health Maintenance   Topic Date Due    Shingles Vaccine (2 of 3) 03/16/2012    Annual Wellness Visit (AWV)  05/29/2019    Potassium monitoring  01/06/2021    Creatinine monitoring  01/06/2021    Breast cancer screen  10/27/2022    DTaP/Tdap/Td vaccine (2 - Td) 06/05/2024    Lipid screen  01/06/2025    Colon cancer screen colonoscopy  07/30/2029    DEXA (modify frequency per FRAX score)  Completed    Flu vaccine  Completed    Pneumococcal 65+ years Vaccine  Completed    Hepatitis C screen  Completed    Hepatitis A vaccine  Aged Out    Hepatitis B vaccine  Aged Out    Hib vaccine  Aged Out    Meningococcal (ACWY) vaccine  Aged Out     Recommendations for [employer] Due: see orders and patient instructions/AVS.  . Recommended screening schedule for the next 5-10 years is provided to the patient in written form: see Patient Instructions/AVS.    Dawn Candelaria was seen today for medicare awv. Diagnoses and all orders for this visit:    Routine general medical examination at a health care facility  - Shingrix at the pharmacy    Return in about 6 months (around 4/29/2021).

## 2020-10-29 NOTE — PATIENT INSTRUCTIONS
Labs currently open:  [address]  [facility], [address]   M-F 7:30am - 4pm  [address] [facility], [address]                                       M-F 7a-6p; Sa 8a-12p        [address], [address]   Robina Kenneyto, [address] [facility]  M-F 7:30am - 5pm    [facility] and [address]  M-F 8a-4:30p  [address]  [address] [place], [address]  Meryle Bracken, Fr 7:30 a-5 p  Wed 7:30 a-12 p  [phone]    [facility]   [address] [facility], [facility] [address]                         M-F 7a-5p                              [address]  [address] [facility], [address] [facility] [address]   [address]  M-F [facility] [address]on Sheth [address], [place] 8am-Noon  [phone]    [address], [address]                                                      M-F 8a-5:30p                        [phone]      Personalized Preventive Plan for Zbigniew Ibrahim - 10/29/2020  Medicare offers a range of preventive health benefits. Some of the tests and screenings are paid in full while other may be subject to a deductible, co-insurance, and/or copay. Some of these benefits include a comprehensive review of your medical history including lifestyle, illnesses that may run in your family, and various assessments and screenings as appropriate. After reviewing your medical record and screening and assessments performed today your provider may have ordered immunizations, labs, imaging, and/or referrals for you.   A list of these orders (if applicable) as well as your Preventive Care list are included within your After Visit Summary for your review. Other Preventive Recommendations:    · A preventive eye exam performed by an eye specialist is recommended every 1-2 years to screen for glaucoma; cataracts, macular degeneration, and other eye disorders. · A preventive dental visit is recommended every 6 months. · Try to get at least 150 minutes of exercise per week or 10,000 steps per day on a pedometer . · Order or download the FREE \"Exercise & Physical Activity: Your Everyday Guide\" from [employer]. Call 0-286.349.5301 or search [employer] online. · You need 5364-4039 mg of calcium and 9677-3416 IU of vitamin D per day. It is possible to meet your calcium requirement with diet alone, but a vitamin D supplement is usually necessary to meet this goal.  · When exposed to the sun, use a sunscreen that protects against both UVA and UVB radiation with an SPF of 30 or greater. Reapply every 2 to 3 hours or after sweating, drying off with a towel, or swimming. · Always wear a seat belt when traveling in a car. Always wear a helmet when riding a bicycle or motorcycle.

## 2020-11-06 DIAGNOSIS — D72.810 LYMPHOCYTOPENIA: ICD-10-CM

## 2020-11-06 DIAGNOSIS — E78.00 PURE HYPERCHOLESTEROLEMIA: ICD-10-CM

## 2020-11-06 DIAGNOSIS — E03.4 HYPOTHYROIDISM DUE TO ACQUIRED ATROPHY OF THYROID: ICD-10-CM

## 2020-11-06 DIAGNOSIS — I10 ESSENTIAL HYPERTENSION: ICD-10-CM

## 2020-11-06 LAB
A/G RATIO: 1.9 (ref 1.1–2.2)
ALBUMIN SERPL-MCNC: 4.2 G/DL (ref 3.4–5)
ALP BLD-CCNC: 53 U/L (ref 40–129)
ALT SERPL-CCNC: 14 U/L (ref 10–40)
ANION GAP SERPL CALCULATED.3IONS-SCNC: 10 MMOL/L (ref 3–16)
AST SERPL-CCNC: 22 U/L (ref 15–37)
BASOPHILS ABSOLUTE: 0 K/UL (ref 0–0.2)
BASOPHILS RELATIVE PERCENT: 0.6 %
BILIRUB SERPL-MCNC: 0.4 MG/DL (ref 0–1)
BUN BLDV-MCNC: 11 MG/DL (ref 7–20)
CALCIUM SERPL-MCNC: 9.5 MG/DL (ref 8.3–10.6)
CHLORIDE BLD-SCNC: 99 MMOL/L (ref 99–110)
CHOLESTEROL, FASTING: 248 MG/DL (ref 0–199)
CO2: 28 MMOL/L (ref 21–32)
CREAT SERPL-MCNC: <0.5 MG/DL (ref 0.6–1.2)
EOSINOPHILS ABSOLUTE: 0.1 K/UL (ref 0–0.6)
EOSINOPHILS RELATIVE PERCENT: 2 %
GFR AFRICAN AMERICAN: >60
GFR NON-AFRICAN AMERICAN: >60
GLOBULIN: 2.2 G/DL
GLUCOSE FASTING: 86 MG/DL (ref 70–99)
HCT VFR BLD CALC: 34.7 % (ref 36–48)
HDLC SERPL-MCNC: 69 MG/DL (ref 40–60)
HEMOGLOBIN: 12 G/DL (ref 12–16)
LDL CHOLESTEROL CALCULATED: 162 MG/DL
LYMPHOCYTES ABSOLUTE: 1 K/UL (ref 1–5.1)
LYMPHOCYTES RELATIVE PERCENT: 35.2 %
MCH RBC QN AUTO: 31.3 PG (ref 26–34)
MCHC RBC AUTO-ENTMCNC: 34.5 G/DL (ref 31–36)
MCV RBC AUTO: 90.8 FL (ref 80–100)
MONOCYTES ABSOLUTE: 0.4 K/UL (ref 0–1.3)
MONOCYTES RELATIVE PERCENT: 13.2 %
NEUTROPHILS ABSOLUTE: 1.4 K/UL (ref 1.7–7.7)
NEUTROPHILS RELATIVE PERCENT: 49 %
PDW BLD-RTO: 12.9 % (ref 12.4–15.4)
PLATELET # BLD: 158 K/UL (ref 135–450)
PMV BLD AUTO: 9.2 FL (ref 5–10.5)
POTASSIUM SERPL-SCNC: 4.7 MMOL/L (ref 3.5–5.1)
RBC # BLD: 3.82 M/UL (ref 4–5.2)
SODIUM BLD-SCNC: 137 MMOL/L (ref 136–145)
TOTAL PROTEIN: 6.4 G/DL (ref 6.4–8.2)
TRIGLYCERIDE, FASTING: 84 MG/DL (ref 0–150)
TSH SERPL DL<=0.05 MIU/L-ACNC: 1.32 UIU/ML (ref 0.27–4.2)
VLDLC SERPL CALC-MCNC: 17 MG/DL
WBC # BLD: 2.9 K/UL (ref 4–11)

## 2020-11-09 RX ORDER — SUMATRIPTAN 50 MG/1
TABLET, FILM COATED ORAL
Qty: 12 TABLET | Refills: 2 | Status: SHIPPED | OUTPATIENT
Start: 2020-11-09 | End: 2022-02-21

## 2020-12-01 ENCOUNTER — OFFICE VISIT (OUTPATIENT)
Dept: PRIMARY CARE CLINIC | Age: 70
End: 2020-12-01
Payer: MEDICARE

## 2020-12-01 PROCEDURE — 99211 OFF/OP EST MAY X REQ PHY/QHP: CPT | Performed by: NURSE PRACTITIONER

## 2020-12-01 NOTE — PROGRESS NOTES
Uriel Hillman received a viral test for COVID-19. They were educated on isolation and quarantine as appropriate. For any symptoms, they were directed to seek care from their PCP, given contact information to establish with a doctor, directed to an urgent care or the emergency room.

## 2020-12-01 NOTE — PATIENT INSTRUCTIONS

## 2020-12-03 LAB — SARS-COV-2, NAA: NOT DETECTED

## 2020-12-18 RX ORDER — METOPROLOL SUCCINATE 25 MG/1
TABLET, EXTENDED RELEASE ORAL
Qty: 90 TABLET | Refills: 1 | Status: SHIPPED | OUTPATIENT
Start: 2020-12-18 | End: 2021-06-16

## 2020-12-23 RX ORDER — CANDESARTAN 16 MG/1
TABLET ORAL
Qty: 90 TABLET | Refills: 1 | Status: SHIPPED | OUTPATIENT
Start: 2020-12-23 | End: 2021-06-20

## 2021-02-24 RX ORDER — LEVOTHYROXINE SODIUM 0.12 MG/1
TABLET ORAL
Qty: 30 TABLET | Refills: 5 | Status: SHIPPED | OUTPATIENT
Start: 2021-02-24 | End: 2021-08-16

## 2021-04-01 RX ORDER — SODIUM CHLORIDE 0.9 % (FLUSH) 0.9 %
10 SYRINGE (ML) INJECTION PRN
Status: CANCELLED | OUTPATIENT
Start: 2021-04-01

## 2021-04-01 RX ORDER — SODIUM CHLORIDE 9 MG/ML
INJECTION, SOLUTION INTRAVENOUS ONCE
Status: CANCELLED | OUTPATIENT
Start: 2021-04-01 | End: 2021-04-01

## 2021-04-01 RX ORDER — ZOLEDRONIC ACID 5 MG/100ML
5 INJECTION, SOLUTION INTRAVENOUS ONCE
Status: CANCELLED | OUTPATIENT
Start: 2021-04-01 | End: 2021-04-01

## 2021-04-06 DIAGNOSIS — M81.0 AGE-RELATED OSTEOPOROSIS WITHOUT CURRENT PATHOLOGICAL FRACTURE: ICD-10-CM

## 2021-04-06 LAB
ANION GAP SERPL CALCULATED.3IONS-SCNC: 8 MMOL/L (ref 3–16)
BUN BLDV-MCNC: 8 MG/DL (ref 7–20)
CALCIUM SERPL-MCNC: 9.4 MG/DL (ref 8.3–10.6)
CHLORIDE BLD-SCNC: 97 MMOL/L (ref 99–110)
CO2: 32 MMOL/L (ref 21–32)
CREAT SERPL-MCNC: <0.5 MG/DL (ref 0.6–1.2)
GFR AFRICAN AMERICAN: >60
GFR NON-AFRICAN AMERICAN: >60
GLUCOSE BLD-MCNC: 87 MG/DL (ref 70–99)
POTASSIUM SERPL-SCNC: 3.9 MMOL/L (ref 3.5–5.1)
SODIUM BLD-SCNC: 137 MMOL/L (ref 136–145)

## 2021-04-06 RX ORDER — CITALOPRAM 10 MG/1
TABLET ORAL
Qty: 30 TABLET | Refills: 5 | Status: SHIPPED | OUTPATIENT
Start: 2021-04-06 | End: 2021-10-18

## 2021-04-14 RX ORDER — ONDANSETRON 4 MG/1
TABLET, FILM COATED ORAL
Qty: 30 TABLET | Refills: 1 | Status: SHIPPED | OUTPATIENT
Start: 2021-04-14 | End: 2021-08-16

## 2021-04-16 ENCOUNTER — HOSPITAL ENCOUNTER (OUTPATIENT)
Dept: ONCOLOGY | Age: 71
Setting detail: INFUSION SERIES
Discharge: HOME | End: 2021-04-16
Payer: MEDICARE

## 2021-04-16 VITALS
HEART RATE: 59 BPM | DIASTOLIC BLOOD PRESSURE: 76 MMHG | TEMPERATURE: 98.2 F | RESPIRATION RATE: 16 BRPM | SYSTOLIC BLOOD PRESSURE: 149 MMHG

## 2021-04-16 DIAGNOSIS — M81.0 AGE-RELATED OSTEOPOROSIS WITHOUT CURRENT PATHOLOGICAL FRACTURE: Primary | ICD-10-CM

## 2021-04-16 PROCEDURE — 96365 THER/PROPH/DIAG IV INF INIT: CPT

## 2021-04-16 PROCEDURE — 6360000002 HC RX W HCPCS: Performed by: INTERNAL MEDICINE

## 2021-04-16 PROCEDURE — 99211 OFF/OP EST MAY X REQ PHY/QHP: CPT

## 2021-04-16 PROCEDURE — 2580000003 HC RX 258: Performed by: INTERNAL MEDICINE

## 2021-04-16 RX ORDER — SODIUM CHLORIDE 0.9 % (FLUSH) 0.9 %
10 SYRINGE (ML) INJECTION PRN
Status: DISCONTINUED | OUTPATIENT
Start: 2021-04-16 | End: 2021-04-16

## 2021-04-16 RX ORDER — SODIUM CHLORIDE 0.9 % (FLUSH) 0.9 %
10 SYRINGE (ML) INJECTION PRN
Status: CANCELLED | OUTPATIENT
Start: 2021-04-16

## 2021-04-16 RX ORDER — SODIUM CHLORIDE 9 MG/ML
INJECTION, SOLUTION INTRAVENOUS ONCE
Status: COMPLETED | OUTPATIENT
Start: 2021-04-16 | End: 2021-04-16

## 2021-04-16 RX ORDER — ZOLEDRONIC ACID 5 MG/100ML
5 INJECTION, SOLUTION INTRAVENOUS ONCE
Status: CANCELLED | OUTPATIENT
Start: 2021-04-16 | End: 2021-04-16

## 2021-04-16 RX ORDER — SODIUM CHLORIDE 9 MG/ML
INJECTION, SOLUTION INTRAVENOUS ONCE
Status: CANCELLED | OUTPATIENT
Start: 2021-04-16 | End: 2021-04-16

## 2021-04-16 RX ORDER — ZOLEDRONIC ACID 5 MG/100ML
5 INJECTION, SOLUTION INTRAVENOUS ONCE
Status: COMPLETED | OUTPATIENT
Start: 2021-04-16 | End: 2021-04-16

## 2021-04-16 RX ADMIN — SODIUM CHLORIDE: 9 INJECTION, SOLUTION INTRAVENOUS at 09:55

## 2021-04-16 RX ADMIN — ZOLEDRONIC ACID 5 MG: 5 INJECTION, SOLUTION INTRAVENOUS at 09:52

## 2021-04-16 RX ADMIN — Medication 10 ML: at 09:55

## 2021-04-16 NOTE — PROGRESS NOTES
Patient to department for first time yearly Reclast infusion. Tolerated infusion well. Given avs with information about possible side effects. Encouraged to drink extra water today. Patient is taking vitamin d and calcium at home. Patient with no reactions noted. Patient to stay until 1100 to monitor for signs/symptoms of reaction for 30 minutes since this is her first time receiving reclast  Patient with no questions or concerns stated.

## 2021-04-27 PROBLEM — R10.13 EPIGASTRIC PAIN: Status: RESOLVED | Noted: 2019-05-21 | Resolved: 2021-04-27

## 2021-04-27 PROBLEM — R53.82 CHRONIC FATIGUE: Status: RESOLVED | Noted: 2019-09-24 | Resolved: 2021-04-27

## 2021-04-27 NOTE — PROGRESS NOTES
Date of Visit:  2021    CC: Conor Vides (: 1950) is a 69 y.o. female, established patient, here for evaluation/re-evaluation of the following medical concerns:    ASSESSMENT/PLAN:  Essential hypertension  Assessment & Plan:  Well-controlled. Continue current antihypertensive regimen. Pure hypercholesterolemia  Assessment & Plan:  Given persistently elevated LDL and intolerance to statins in the past, will obtain cardiac calcium scan to further stratify CV risk. Continue Mediterranean Diet. Orders:  -     CT CARDIAC CALCIUM SCORING; Future  Hypothyroidism due to acquired atrophy of thyroid  Assessment & Plan:  Constipation is likely multifactorial, but will adjust levothyroxine dose if indicated by lab results. Hiatal hernia  Assessment & Plan:  Symptoms recently worse- will try increasing Prevacid to 30 mg qd. Will also obtain abdominal US to help rule out other etiologies, such as gallbladder disease. Dyspepsia  Comments:  See plan for hiatal hernia. Orders:  -     US ABDOMEN COMPLETE; Future  Major depressive disorder with single episode, in full remission [facility])  Assessment & Plan:  Doing well on low dose Celexa, which she would like to continue. Return in about 6 months (around 10/29/2021) for MEDICARE AWV. Vitals:    21 0929   BP: 124/80   Pulse: 60   Weight: 146 lb (66.2 kg)   Height: 5' 4\" (1.626 m)      Estimated body mass index is 25.06 kg/m² as calculated from the following:    Height as of this encounter: 5' 4\" (1.626 m). Weight as of this encounter: 146 lb (66.2 kg). Wt Readings from Last 3 Encounters:   21 146 lb (66.2 kg)   10/29/20 153 lb (69.4 kg)   20 150 lb 3.2 oz (68.1 kg)     BP Readings from Last 3 Encounters:   21 124/80   21 (!) 149/76   10/29/20 118/70     HPI  Hypertension:  Home blood pressure monitorins/70-80s. She is adherent to a low sodium diet.  Patient denies chest pain, shortness of breath, headache, lightheadedness and palpitations.  Antihypertensive medication side effects: no medication side effects noted.  Use of agents associated with hypertension: none. Recent BMP unremarkable.     Hyperlipidemia:  Patient is following the Mediterranean Diet, but has room for improvement. She usually exercises regularly- walking 3-4x/week, strength training 15 minutes/day, but 3 week break due to knee pain- Euflexa injections planned.       Hypothyroidism: Recent symptoms:  constipation. She denies fatigue, hair loss, weight changes, dry skin, diarrhea. Patient is taking her medication consistently on an empty stomach.     GI Disorder:  Patient presents for follow-up of GERD- chronic.  Current symptoms include intermittent dyspepsia, nausea.  Symptoms are stable.  Patient denies heartburn, dysphagia, heartburn, vomiting, abdominal pain, abdominal bloating, constipation, diarrhea, melena and hematochezia.  Current treatment includes: proton pump inhibitor- Prevacid 15 mg qd, Align daily, Miralax every other day.  Medication side effects:  none.  Recent diagnostic testing: EGD, colonoscopy 7/19- 1 benign colon polyp, mild gastritis and esophagitis. Mood Disorder:  Patient presents for follow-up of depression. Current complaints include: none. She denies anhedonia, depressed mood, tearfulness, feelings of hopelessness, insomnia, difficulty concentrating, irritability and excessive worry. Symptoms/signs of lili: none. External stressors: nothing new. Current treatment includes: Celexa- 10 mg qd. Medication side effects: none. ROS  As documented above    Physical Exam  Constitutional:       General: She is not in acute distress. Appearance: She is well-developed. Eyes:      Conjunctiva/sclera: Conjunctivae normal.   Neck:      Thyroid: No thyroid mass or thyromegaly. Cardiovascular:      Rate and Rhythm: Normal rate and regular rhythm. Heart sounds: Normal heart sounds. No murmur. No friction rub.  No gallop. Pulmonary:      Effort: Pulmonary effort is normal. No respiratory distress. Breath sounds: Normal breath sounds. No wheezing, rhonchi or rales. Musculoskeletal:      Right lower leg: No edema. Left lower leg: No edema. Lymphadenopathy:      Cervical: No cervical adenopathy. Skin:     General: Skin is warm and dry. Findings: No erythema or rash. Neurological:      Mental Status: She is alert and oriented to person, place, and time. Psychiatric:         Speech: Speech normal.         Behavior: Behavior normal.         Thought Content: Thought content normal.         Judgment: Judgment normal.           --Jordan Harris MD on 4/29/2021 at 10:16 AM    An electronic signature was used to authenticate this note.

## 2021-04-29 ENCOUNTER — OFFICE VISIT (OUTPATIENT)
Dept: INTERNAL MEDICINE CLINIC | Age: 71
End: 2021-04-29
Payer: MEDICARE

## 2021-04-29 VITALS
WEIGHT: 146 LBS | BODY MASS INDEX: 24.92 KG/M2 | DIASTOLIC BLOOD PRESSURE: 80 MMHG | HEART RATE: 60 BPM | HEIGHT: 64 IN | SYSTOLIC BLOOD PRESSURE: 124 MMHG

## 2021-04-29 DIAGNOSIS — E78.00 PURE HYPERCHOLESTEROLEMIA: ICD-10-CM

## 2021-04-29 DIAGNOSIS — I10 ESSENTIAL HYPERTENSION: Primary | ICD-10-CM

## 2021-04-29 DIAGNOSIS — E03.4 HYPOTHYROIDISM DUE TO ACQUIRED ATROPHY OF THYROID: ICD-10-CM

## 2021-04-29 DIAGNOSIS — F32.5 MAJOR DEPRESSIVE DISORDER WITH SINGLE EPISODE, IN FULL REMISSION (HCC): Chronic | ICD-10-CM

## 2021-04-29 DIAGNOSIS — K44.9 HIATAL HERNIA: Chronic | ICD-10-CM

## 2021-04-29 DIAGNOSIS — R10.13 DYSPEPSIA: ICD-10-CM

## 2021-04-29 PROBLEM — M17.0 OSTEOARTHRITIS OF BOTH KNEES: Status: ACTIVE | Noted: 2021-04-29

## 2021-04-29 PROCEDURE — 99214 OFFICE O/P EST MOD 30 MIN: CPT | Performed by: INTERNAL MEDICINE

## 2021-04-29 RX ORDER — LANSOPRAZOLE 30 MG/1
30 CAPSULE, DELAYED RELEASE ORAL DAILY
Qty: 30 CAPSULE | Refills: 5 | Status: SHIPPED | OUTPATIENT
Start: 2021-04-29 | End: 2021-10-21

## 2021-04-29 NOTE — ASSESSMENT & PLAN NOTE
Symptoms recently worse- will try increasing Prevacid to 30 mg qd. Will also obtain abdominal US to help rule out other etiologies, such as gallbladder disease.

## 2021-04-29 NOTE — ASSESSMENT & PLAN NOTE
Constipation is likely multifactorial, but will adjust levothyroxine dose if indicated by lab results.

## 2021-05-05 RX ORDER — FLUTICASONE PROPIONATE 50 MCG
SPRAY, SUSPENSION (ML) NASAL
Qty: 1 BOTTLE | Refills: 11 | Status: SHIPPED | OUTPATIENT
Start: 2021-05-05 | End: 2022-05-13

## 2021-05-11 ENCOUNTER — HOSPITAL ENCOUNTER (OUTPATIENT)
Dept: CT IMAGING | Age: 71
Discharge: HOME | End: 2021-05-11
Payer: MEDICARE

## 2021-05-11 ENCOUNTER — HOSPITAL ENCOUNTER (OUTPATIENT)
Dept: ULTRASOUND IMAGING | Age: 71
Discharge: HOME | End: 2021-05-11
Payer: MEDICARE

## 2021-05-11 DIAGNOSIS — R10.13 DYSPEPSIA: ICD-10-CM

## 2021-05-11 DIAGNOSIS — E78.00 PURE HYPERCHOLESTEROLEMIA: ICD-10-CM

## 2021-05-11 PROCEDURE — 75571 CT HRT W/O DYE W/CA TEST: CPT

## 2021-05-11 PROCEDURE — 76700 US EXAM ABDOM COMPLETE: CPT

## 2021-06-16 RX ORDER — METOPROLOL SUCCINATE 25 MG/1
TABLET, EXTENDED RELEASE ORAL
Qty: 90 TABLET | Refills: 1 | Status: SHIPPED | OUTPATIENT
Start: 2021-06-16 | End: 2021-12-30

## 2021-06-20 RX ORDER — CANDESARTAN 16 MG/1
TABLET ORAL
Qty: 90 TABLET | Refills: 1 | Status: SHIPPED | OUTPATIENT
Start: 2021-06-20 | End: 2021-12-21

## 2021-08-16 RX ORDER — LEVOTHYROXINE SODIUM 0.12 MG/1
TABLET ORAL
Qty: 30 TABLET | Refills: 5 | Status: SHIPPED | OUTPATIENT
Start: 2021-08-16 | End: 2022-02-13

## 2021-08-16 RX ORDER — ONDANSETRON 4 MG/1
TABLET, FILM COATED ORAL
Qty: 30 TABLET | Refills: 1 | Status: SHIPPED | OUTPATIENT
Start: 2021-08-16 | End: 2022-02-21

## 2021-08-16 NOTE — TELEPHONE ENCOUNTER
Last appointment: 4/29/2021  Next appointment: 11/5/2021  Last refill:   Synthroid 30 with 5 02/24/2021  Zofran 30 with 1 04/14/2021

## 2021-10-21 RX ORDER — LANSOPRAZOLE 30 MG/1
CAPSULE, DELAYED RELEASE ORAL
Qty: 30 CAPSULE | Refills: 5 | Status: SHIPPED | OUTPATIENT
Start: 2021-10-21 | End: 2022-05-03

## 2021-11-05 ENCOUNTER — OFFICE VISIT (OUTPATIENT)
Dept: INTERNAL MEDICINE CLINIC | Age: 71
End: 2021-11-05
Payer: MEDICARE

## 2021-11-05 VITALS
RESPIRATION RATE: 16 BRPM | SYSTOLIC BLOOD PRESSURE: 110 MMHG | HEART RATE: 69 BPM | WEIGHT: 147 LBS | DIASTOLIC BLOOD PRESSURE: 78 MMHG | OXYGEN SATURATION: 98 % | BODY MASS INDEX: 25.1 KG/M2 | HEIGHT: 64 IN

## 2021-11-05 DIAGNOSIS — E03.4 HYPOTHYROIDISM DUE TO ACQUIRED ATROPHY OF THYROID: ICD-10-CM

## 2021-11-05 DIAGNOSIS — I10 ESSENTIAL HYPERTENSION: ICD-10-CM

## 2021-11-05 DIAGNOSIS — E78.00 PURE HYPERCHOLESTEROLEMIA: ICD-10-CM

## 2021-11-05 DIAGNOSIS — N32.81 OVERACTIVE BLADDER: Chronic | ICD-10-CM

## 2021-11-05 DIAGNOSIS — D72.810 LYMPHOCYTOPENIA: ICD-10-CM

## 2021-11-05 DIAGNOSIS — M81.0 AGE-RELATED OSTEOPOROSIS WITHOUT CURRENT PATHOLOGICAL FRACTURE: ICD-10-CM

## 2021-11-05 DIAGNOSIS — Z00.00 ROUTINE GENERAL MEDICAL EXAMINATION AT A HEALTH CARE FACILITY: Primary | ICD-10-CM

## 2021-11-05 PROCEDURE — G0439 PPPS, SUBSEQ VISIT: HCPCS | Performed by: INTERNAL MEDICINE

## 2021-11-05 PROCEDURE — 99213 OFFICE O/P EST LOW 20 MIN: CPT | Performed by: INTERNAL MEDICINE

## 2021-11-05 SDOH — ECONOMIC STABILITY: FOOD INSECURITY: WITHIN THE PAST 12 MONTHS, YOU WORRIED THAT YOUR FOOD WOULD RUN OUT BEFORE YOU GOT MONEY TO BUY MORE.: NEVER TRUE

## 2021-11-05 SDOH — HEALTH STABILITY: PHYSICAL HEALTH: ON AVERAGE, HOW MANY DAYS PER WEEK DO YOU ENGAGE IN MODERATE TO STRENUOUS EXERCISE (LIKE A BRISK WALK)?: 3 DAYS

## 2021-11-05 SDOH — ECONOMIC STABILITY: INCOME INSECURITY: IN THE LAST 12 MONTHS, WAS THERE A TIME WHEN YOU WERE NOT ABLE TO PAY THE MORTGAGE OR RENT ON TIME?: NO

## 2021-11-05 SDOH — ECONOMIC STABILITY: HOUSING INSECURITY
IN THE LAST 12 MONTHS, WAS THERE A TIME WHEN YOU DID NOT HAVE A STEADY PLACE TO SLEEP OR SLEPT IN A SHELTER (INCLUDING NOW)?: NO

## 2021-11-05 SDOH — HEALTH STABILITY: PHYSICAL HEALTH: ON AVERAGE, HOW MANY MINUTES DO YOU ENGAGE IN EXERCISE AT THIS LEVEL?: 40 MIN

## 2021-11-05 SDOH — ECONOMIC STABILITY: FOOD INSECURITY: WITHIN THE PAST 12 MONTHS, THE FOOD YOU BOUGHT JUST DIDN'T LAST AND YOU DIDN'T HAVE MONEY TO GET MORE.: NEVER TRUE

## 2021-11-05 SDOH — ECONOMIC STABILITY: TRANSPORTATION INSECURITY
IN THE PAST 12 MONTHS, HAS LACK OF TRANSPORTATION KEPT YOU FROM MEETINGS, WORK, OR FROM GETTING THINGS NEEDED FOR DAILY LIVING?: NO

## 2021-11-05 SDOH — ECONOMIC STABILITY: TRANSPORTATION INSECURITY
IN THE PAST 12 MONTHS, HAS THE LACK OF TRANSPORTATION KEPT YOU FROM MEDICAL APPOINTMENTS OR FROM GETTING MEDICATIONS?: NO

## 2021-11-05 ASSESSMENT — PATIENT HEALTH QUESTIONNAIRE - PHQ9
SUM OF ALL RESPONSES TO PHQ QUESTIONS 1-9: 0
1. LITTLE INTEREST OR PLEASURE IN DOING THINGS: 0
SUM OF ALL RESPONSES TO PHQ QUESTIONS 1-9: 0
SUM OF ALL RESPONSES TO PHQ9 QUESTIONS 1 & 2: 0
2. FEELING DOWN, DEPRESSED OR HOPELESS: 0
SUM OF ALL RESPONSES TO PHQ QUESTIONS 1-9: 0

## 2021-11-05 ASSESSMENT — SOCIAL DETERMINANTS OF HEALTH (SDOH)
WITHIN THE LAST YEAR, HAVE YOU BEEN HUMILIATED OR EMOTIONALLY ABUSED IN OTHER WAYS BY YOUR PARTNER OR EX-PARTNER?: NO
HOW OFTEN DO YOU ATTEND CHURCH OR RELIGIOUS SERVICES?: NEVER
WITHIN THE LAST YEAR, HAVE YOU BEEN AFRAID OF YOUR PARTNER OR EX-PARTNER?: NO
WITHIN THE LAST YEAR, HAVE TO BEEN RAPED OR FORCED TO HAVE ANY KIND OF SEXUAL ACTIVITY BY YOUR PARTNER OR EX-PARTNER?: NO
WITHIN THE LAST YEAR, HAVE YOU BEEN KICKED, HIT, SLAPPED, OR OTHERWISE PHYSICALLY HURT BY YOUR PARTNER OR EX-PARTNER?: NO
HOW OFTEN DO YOU ATTENT MEETINGS OF THE CLUB OR ORGANIZATION YOU BELONG TO?: NEVER
HOW OFTEN DO YOU GET TOGETHER WITH FRIENDS OR RELATIVES?: THREE TIMES A WEEK
DO YOU BELONG TO ANY CLUBS OR ORGANIZATIONS SUCH AS CHURCH GROUPS UNIONS, FRATERNAL OR ATHLETIC GROUPS, OR SCHOOL GROUPS?: NO
IN A TYPICAL WEEK, HOW MANY TIMES DO YOU TALK ON THE PHONE WITH FAMILY, FRIENDS, OR NEIGHBORS?: THREE TIMES A WEEK
HOW HARD IS IT FOR YOU TO PAY FOR THE VERY BASICS LIKE FOOD, HOUSING, MEDICAL CARE, AND HEATING?: NOT HARD AT ALL

## 2021-11-05 ASSESSMENT — LIFESTYLE VARIABLES
HOW OFTEN DO YOU HAVE A DRINK CONTAINING ALCOHOL: NEVER
HOW OFTEN DO YOU HAVE A DRINK CONTAINING ALCOHOL: 0

## 2021-11-05 NOTE — PATIENT INSTRUCTIONS
Personalized Preventive Plan for Connie Piña - 11/5/2021  Medicare offers a range of preventive health benefits. Some of the tests and screenings are paid in full while other may be subject to a deductible, co-insurance, and/or copay. Some of these benefits include a comprehensive review of your medical history including lifestyle, illnesses that may run in your family, and various assessments and screenings as appropriate. After reviewing your medical record and screening and assessments performed today your provider may have ordered immunizations, labs, imaging, and/or referrals for you. A list of these orders (if applicable) as well as your Preventive Care list are included within your After Visit Summary for your review. Other Preventive Recommendations:    · A preventive eye exam performed by an eye specialist is recommended every 1-2 years to screen for glaucoma; cataracts, macular degeneration, and other eye disorders. · A preventive dental visit is recommended every 6 months. · Try to get at least 150 minutes of exercise per week or 10,000 steps per day on a pedometer . · Order or download the FREE \"Exercise & Physical Activity: Your Everyday Guide\" from [employer]. Call 7-253.173.9232 or search [employer] online. · You need 5636-6280 mg of calcium and 2123-1222 IU of vitamin D per day. It is possible to meet your calcium requirement with diet alone, but a vitamin D supplement is usually necessary to meet this goal.  · When exposed to the sun, use a sunscreen that protects against both UVA and UVB radiation with an SPF of 30 or greater. Reapply every 2 to 3 hours or after sweating, drying off with a towel, or swimming. · Always wear a seat belt when traveling in a car. Always wear a helmet when riding a bicycle or motorcycle.

## 2021-11-05 NOTE — PROGRESS NOTES
Medicare Annual Wellness Visit  Name: Kassie Muhammad Date: 2021   MRN: [id number] Sex: Female   Age: 66 y.o. Ethnicity: Non- / Non    : 1950 Race: White (non-)      Eileen Fitzgerald is here for Medicare AWV (Reflux)    Screenings for behavioral, psychosocial and functional/safety risks, and cognitive dysfunction are all negative except as indicated below. These results, as well as other patient data from the [address] form, are documented in Flowsheets linked to this Encounter. Allergies   Allergen Reactions    Cephalexin Hives         Prior to Visit Medications    Medication Sig Taking? Authorizing Provider   lansoprazole (PREVACID) 30 MG delayed release capsule TAKE ONE CAPSULE BY MOUTH DAILY Yes Keli Kang MD   citalopram (CELEXA) 10 MG tablet TAKE ONE TABLET BY MOUTH DAILY Yes Keli Kang MD   levothyroxine (SYNTHROID) 125 MCG tablet TAKE ONE TABLET BY MOUTH DAILY Yes Keli Kang MD   ondansetron (ZOFRAN) 4 MG tablet TAKE ONE TABLET BY MOUTH DAILY AS NEEDED FOR VOMITING Yes Keli Kang MD   candesartan (ATACAND) 16 MG tablet TAKE ONE TABLET BY MOUTH DAILY Yes Keli Kang MD   metoprolol succinate (TOPROL XL) 25 MG extended release tablet TAKE ONE TABLET BY MOUTH DAILY Yes Keli Kang MD   fluticasone (FLONASE) 50 MCG/ACT nasal spray SPRAY ONE SPRAY IN EACH NOSTRIL ONCE DAILY Yes Keli Kang MD   zoledronic acid (RECLAST) 5 MG/100ML SOLN Infuse 100 mLs intravenously once for 1 dose Yes Keli Kang MD   SUMAtriptan (IMITREX) 50 MG tablet TAKE ONE TABLET BY MOUTH AT ONSET OF HEADACHE; MAY REPEAT ONE TABLET IN 2 HOURS IF NEEDED.  Yes Keli Kang MD   Calcium Citrate-Vitamin D (CALCIUM + D PO) Take by mouth Yes Historical Provider, MD   polyethylene glycol (GLYCOLAX) 17 g packet Take 17 g by mouth every other day Yes Historical Provider, MD   clobetasol (TEMOVATE) 0.05 % cream Apply to affected areas on the legs twice daily if needed for recurrence of dermatitis. Yes Maryse Edmonds MD   Aspirin-Acetaminophen-Caffeine (EXCEDRIN MIGRAINE PO) Take by mouth Yes Historical Provider, MD   Misc Intestinal Lou Regulat (ALIGN PO) Take  by mouth. Yes Historical Provider, MD         Past Medical History:   Diagnosis Date    Allergic rhinitis     Anxiety     Depression     Hiatal hernia     Hyperlipidemia     Hypertension     Hypothyroidism     Migraine headache     Osteoporosis     Overactive bladder     Rupture of tendon of foot region     Right    Spondyloarthropathy     L5-S1    Tear of meniscus of left knee 9/5/2014       Past Surgical History:   Procedure Laterality Date    ANKLE SURGERY  1/17/11    Right gastroc lenghtening, proximal tibial bone graft triple arthrodesis of ankle    DILATION AND CURETTAGE OF UTERUS      x2    KNEE ARTHROSCOPY Left 1/28/15    TEAR DUCT SURGERY  2002         Family History   Problem Relation Age of Onset    Ovarian Cancer Mother         Age 48    Hypertension Father     Thyroid Disease Father     Diabetes Maternal Grandfather         Type I       CareTeam (Including outside providers/suppliers regularly involved in providing care):   Patient Care Team:  Wenceslao Wang MD as PCP - General (Internal Medicine)  Len Ojeda DO as PCP - Hematology/Oncology (Hematology and Oncology)  Wenceslao Wang MD as PCP - [facility] Zeyad Valentinled Provider    Wt Readings from Last 3 Encounters:   11/05/21 147 lb (66.7 kg)   04/29/21 146 lb (66.2 kg)   10/29/20 153 lb (69.4 kg)     Vitals:    11/05/21 0950   BP: 110/78   Pulse: 69   Resp: 16   SpO2: 98%   Weight: 147 lb (66.7 kg)   Height: 5' 4\" (1.626 m)     Body mass index is 25.23 kg/m². Based upon direct observation of the patient, evaluation of cognition reveals recent and remote memory intact. Physical Exam  Constitutional:       General: She is not in acute distress. Appearance: She is well-developed.    Eyes: Administered    COVID-19, Lan Peter, PF, 30mcg/0.3mL 02/05/2021, 02/26/2021, 09/24/2021    Influenza Vaccine, unspecified formulation 09/23/2015    Influenza Virus Vaccine 09/20/2011, 10/12/2012, 09/30/2014, 09/19/2016    Influenza Whole 09/27/2010    Influenza, High Dose (Fluzone 65 yrs and older) 09/14/2017, 09/20/2018, 09/07/2019, 09/24/2020    MMR 06/10/2014    Pneumococcal Conjugate 13-valent ([id number]) 11/18/2014    Pneumococcal Polysaccharide ([id number]) 07/07/2016    Td, unspecified formulation 06/17/2004    Tdap (Boostrix, Adacel) 06/05/2014    Zoster Live (Zostavax) 01/20/2012        Health Maintenance   Topic Date Due    Shingles Vaccine (2 of 3) 03/16/2012    Annual Wellness Visit (AWV)  10/30/2021    Potassium monitoring  04/06/2022    Creatinine monitoring  04/06/2022    Breast cancer screen  10/27/2022    DTaP/Tdap/Td vaccine (2 - Td or Tdap) 06/05/2024    Lipid screen  11/06/2025    Colon cancer screen colonoscopy  07/30/2029    DEXA (modify frequency per FRAX score)  Completed    Flu vaccine  Completed    Pneumococcal 65+ years Vaccine  Completed    COVID-19 Vaccine  Completed    Hepatitis C screen  Completed    Hepatitis A vaccine  Aged Out    Hepatitis B vaccine  Aged Out    Hib vaccine  Aged Out    Meningococcal (ACWY) vaccine  Aged Out     Recommendations for [employer] Due: see orders and patient instructions/AVS.  . Recommended screening schedule for the next 5-10 years is provided to the patient in written form: see Patient Instructions/AVS.    Lizzy Platt was seen today for medicare awv. Routine general medical examination at a health care facility  Shingrix per pharmacy  Essential hypertension  Assessment & Plan:  Well-controlled. Continue current antihypertensive regimen. If orthostatic symptoms develop, will cut dose of candesartan. Orders:  -     Comprehensive Metabolic Panel, Fasting;  Future  Hypothyroidism due to acquired atrophy of thyroid  Assessment & Plan:  Clinically euthyroid, but will adjust levothyroxine dose if indicated by lab results. Orders:  -     TSH without Reflex; Future  Age-related osteoporosis without current pathological fracture  Assessment & Plan: Tolerating yearly Reclast- continue. Next dose due 4/22. Next dexa due 9/22. Continue calcium, vitamin D and weight-bearing exercise. Pure hypercholesterolemia  Assessment & Plan:  Importance of continued lifestyle changes stressed to help prevent need for cholesterol medication in the future. Cardiac calcium score 17- 5/21, repeat in 3 years to ensure stability. Orders:  -     Lipid, Fasting; Future  Lymphocytopenia  Assessment & Plan:  Asymptomatic. Will continue to monitor and refer back to hematology if counts drop significantly. Orders:  -     CBC Auto Differential; Future  Overactive bladder  Assessment & Plan:  Urgency worse-  no other urinary symptoms except intermittently feels that her bladder is not emptying completely. Declines UA today. Patient referred to urogynecology. Orders:  -     Francisca Blankenship MD, Urogynecology, [facility]    Return in about 5 months (around 4/5/2022) for 30 MIN F/U- Video.

## 2021-11-05 NOTE — ASSESSMENT & PLAN NOTE
Urgency worse-  no other urinary symptoms except intermittently feels that her bladder is not emptying completely. Declines UA today. Patient referred to urogynecology.

## 2021-11-05 NOTE — ASSESSMENT & PLAN NOTE
Tolerating yearly Reclast- continue. Next dose due 4/22. Next dexa due 9/22. Continue calcium, vitamin D and weight-bearing exercise.

## 2021-11-05 NOTE — ASSESSMENT & PLAN NOTE
Well-controlled. Continue current antihypertensive regimen. If orthostatic symptoms develop, will cut dose of candesartan.

## 2021-11-05 NOTE — ASSESSMENT & PLAN NOTE
Importance of continued lifestyle changes stressed to help prevent need for cholesterol medication in the future. Cardiac calcium score 17- 5/21, repeat in 3 years to ensure stability.

## 2021-12-08 ENCOUNTER — HOSPITAL ENCOUNTER (OUTPATIENT)
Dept: WOMENS IMAGING | Age: 71
Discharge: HOME | End: 2021-12-08
Payer: MEDICARE

## 2021-12-08 VITALS — BODY MASS INDEX: 24.32 KG/M2 | HEIGHT: 65 IN | WEIGHT: 146 LBS

## 2021-12-08 DIAGNOSIS — Z12.31 BREAST CANCER SCREENING BY MAMMOGRAM: ICD-10-CM

## 2021-12-08 PROCEDURE — 77063 BREAST TOMOSYNTHESIS BI: CPT

## 2021-12-09 DIAGNOSIS — D72.810 LYMPHOCYTOPENIA: ICD-10-CM

## 2021-12-09 DIAGNOSIS — I10 ESSENTIAL HYPERTENSION: ICD-10-CM

## 2021-12-09 DIAGNOSIS — E03.4 HYPOTHYROIDISM DUE TO ACQUIRED ATROPHY OF THYROID: ICD-10-CM

## 2021-12-09 DIAGNOSIS — E78.00 PURE HYPERCHOLESTEROLEMIA: ICD-10-CM

## 2021-12-09 LAB
A/G RATIO: 1.6 (ref 1.1–2.2)
ALBUMIN SERPL-MCNC: 4.5 G/DL (ref 3.4–5)
ALP BLD-CCNC: 61 U/L (ref 40–129)
ALT SERPL-CCNC: 19 U/L (ref 10–40)
ANION GAP SERPL CALCULATED.3IONS-SCNC: 14 MMOL/L (ref 3–16)
AST SERPL-CCNC: 31 U/L (ref 15–37)
BASOPHILS ABSOLUTE: 0 K/UL (ref 0–0.2)
BASOPHILS RELATIVE PERCENT: 0.8 %
BILIRUB SERPL-MCNC: 0.4 MG/DL (ref 0–1)
BUN BLDV-MCNC: 10 MG/DL (ref 7–20)
CALCIUM SERPL-MCNC: 9.6 MG/DL (ref 8.3–10.6)
CHLORIDE BLD-SCNC: 98 MMOL/L (ref 99–110)
CHOLESTEROL, FASTING: 245 MG/DL (ref 0–199)
CO2: 25 MMOL/L (ref 21–32)
CREAT SERPL-MCNC: <0.5 MG/DL (ref 0.6–1.2)
EOSINOPHILS ABSOLUTE: 0.1 K/UL (ref 0–0.6)
EOSINOPHILS RELATIVE PERCENT: 2 %
GFR AFRICAN AMERICAN: >60
GFR NON-AFRICAN AMERICAN: >60
GLUCOSE FASTING: 87 MG/DL (ref 70–99)
HCT VFR BLD CALC: 38.6 % (ref 36–48)
HDLC SERPL-MCNC: 68 MG/DL (ref 40–60)
HEMOGLOBIN: 12.7 G/DL (ref 12–16)
LDL CHOLESTEROL CALCULATED: 160 MG/DL
LYMPHOCYTES ABSOLUTE: 1 K/UL (ref 1–5.1)
LYMPHOCYTES RELATIVE PERCENT: 35.8 %
MCH RBC QN AUTO: 30.1 PG (ref 26–34)
MCHC RBC AUTO-ENTMCNC: 33 G/DL (ref 31–36)
MCV RBC AUTO: 91.2 FL (ref 80–100)
MONOCYTES ABSOLUTE: 0.4 K/UL (ref 0–1.3)
MONOCYTES RELATIVE PERCENT: 13.1 %
NEUTROPHILS ABSOLUTE: 1.3 K/UL (ref 1.7–7.7)
NEUTROPHILS RELATIVE PERCENT: 48.3 %
PDW BLD-RTO: 13.2 % (ref 12.4–15.4)
PLATELET # BLD: 172 K/UL (ref 135–450)
PMV BLD AUTO: 8.9 FL (ref 5–10.5)
POTASSIUM SERPL-SCNC: 4.6 MMOL/L (ref 3.5–5.1)
RBC # BLD: 4.23 M/UL (ref 4–5.2)
SODIUM BLD-SCNC: 137 MMOL/L (ref 136–145)
TOTAL PROTEIN: 7.4 G/DL (ref 6.4–8.2)
TRIGLYCERIDE, FASTING: 87 MG/DL (ref 0–150)
TSH SERPL DL<=0.05 MIU/L-ACNC: 0.1 UIU/ML (ref 0.27–4.2)
VLDLC SERPL CALC-MCNC: 17 MG/DL
WBC # BLD: 2.7 K/UL (ref 4–11)

## 2021-12-21 RX ORDER — CANDESARTAN 16 MG/1
TABLET ORAL
Qty: 90 TABLET | Refills: 1 | Status: SHIPPED | OUTPATIENT
Start: 2021-12-21 | End: 2022-06-20

## 2021-12-30 RX ORDER — METOPROLOL SUCCINATE 25 MG/1
TABLET, EXTENDED RELEASE ORAL
Qty: 90 TABLET | Refills: 1 | Status: SHIPPED | OUTPATIENT
Start: 2021-12-30 | End: 2022-06-27

## 2022-01-17 ENCOUNTER — TELEPHONE (OUTPATIENT)
Dept: DERMATOLOGY | Age: 72
End: 2022-01-17

## 2022-01-17 NOTE — TELEPHONE ENCOUNTER
Dr Boyd Gilpin patient  Pt c/b #[phone]  Pt stated  Has a black growth near her eye that is the size of a pencil point. Pt noticed this spot yesterday. Pt denies any pain or any other concern. Pt says she's just concerned due to it's close by her eye. Pt was made aware dr Boyd Gilpin was scheduled out to may and we will watch for any cancellations.

## 2022-02-13 RX ORDER — LEVOTHYROXINE SODIUM 0.12 MG/1
TABLET ORAL
Qty: 30 TABLET | Refills: 5 | Status: SHIPPED
Start: 2022-02-13 | End: 2022-05-03 | Stop reason: DRUGHIGH

## 2022-02-16 ENCOUNTER — OFFICE VISIT (OUTPATIENT)
Dept: DERMATOLOGY | Age: 72
End: 2022-02-16
Payer: MEDICARE

## 2022-02-16 VITALS — TEMPERATURE: 97.3 F

## 2022-02-16 DIAGNOSIS — L81.4 SOLAR LENTIGO: ICD-10-CM

## 2022-02-16 DIAGNOSIS — L82.1 SK (SEBORRHEIC KERATOSIS): Primary | ICD-10-CM

## 2022-02-16 DIAGNOSIS — L30.9 CHRONIC DERMATITIS: ICD-10-CM

## 2022-02-16 PROCEDURE — 99213 OFFICE O/P EST LOW 20 MIN: CPT | Performed by: DERMATOLOGY

## 2022-02-16 RX ORDER — CLOBETASOL PROPIONATE 0.5 MG/G
CREAM TOPICAL
Qty: 60 G | Refills: 1 | Status: SHIPPED | OUTPATIENT
Start: 2022-02-16 | End: 2022-08-01

## 2022-02-16 NOTE — PROGRESS NOTES
[facility]  Eugene Abarca MD  [address]  [id number]    66 y.o. female     Date of Visit: 2/16/2022    Chief Complaint: skin lesions    History of Present Illness:    1. She reports an asymptomatic lesion on the left side of the forehead. 2.  She also has multiple unchanging pigmented lesions on the face and legs. 3.  She has a history of chronic dermatitis of the shins (pretibial papular dermatitis). She has overall done well over the last few years with very rare recurrences. Improves with use of clobetasol cream.      Review of Systems:  Gen: Feels well, good sense of health. Skin: No new or changing moles. Past Medical History, Family History, Surgical History, Medications and Allergies reviewed. Past Medical History:   Diagnosis Date    Allergic rhinitis     Anxiety     Depression     Hiatal hernia     Hyperlipidemia     Hypertension     Hypothyroidism     Migraine headache     Osteoporosis     Overactive bladder     Rupture of tendon of foot region     Right    Spondyloarthropathy     L5-S1    Tear of meniscus of left knee 9/5/2014     Past Surgical History:   Procedure Laterality Date    ANKLE SURGERY  1/17/11    Right gastroc lenghtening, proximal tibial bone graft triple arthrodesis of ankle    DILATION AND CURETTAGE OF UTERUS      x2    KNEE ARTHROSCOPY Left 1/28/15    TEAR DUCT SURGERY  2002       Allergies   Allergen Reactions    Cephalexin Hives     Outpatient Medications Marked as Taking for the 2/16/22 encounter (Office Visit) with Sarah Foley MD   Medication Sig Dispense Refill    clobetasol (TEMOVATE) 0.05 % cream Apply to affected areas on the legs twice daily if needed for recurrence of dermatitis.  60 g 1    levothyroxine (SYNTHROID) 125 MCG tablet TAKE ONE TABLET BY MOUTH DAILY 30 tablet 5    metoprolol succinate (TOPROL XL) 25 MG extended release tablet TAKE ONE TABLET BY MOUTH DAILY 90 tablet 1    candesartan (ATACAND) 16 MG tablet TAKE ONE TABLET BY MOUTH DAILY 90 tablet 1    lansoprazole (PREVACID) 30 MG delayed release capsule TAKE ONE CAPSULE BY MOUTH DAILY 30 capsule 5    citalopram (CELEXA) 10 MG tablet TAKE ONE TABLET BY MOUTH DAILY 90 tablet 1    ondansetron (ZOFRAN) 4 MG tablet TAKE ONE TABLET BY MOUTH DAILY AS NEEDED FOR VOMITING 30 tablet 1    fluticasone (FLONASE) 50 MCG/ACT nasal spray SPRAY ONE SPRAY IN EACH NOSTRIL ONCE DAILY 1 Bottle 11    zoledronic acid (RECLAST) 5 MG/100ML SOLN Infuse 100 mLs intravenously once for 1 dose 100 mL 0    SUMAtriptan (IMITREX) 50 MG tablet TAKE ONE TABLET BY MOUTH AT ONSET OF HEADACHE; MAY REPEAT ONE TABLET IN 2 HOURS IF NEEDED. 12 tablet 2    Calcium Citrate-Vitamin D (CALCIUM + D PO) Take by mouth      polyethylene glycol (GLYCOLAX) 17 g packet Take 17 g by mouth every other day      Aspirin-Acetaminophen-Caffeine (EXCEDRIN MIGRAINE PO) Take by mouth      Misc Intestinal Lou Regulat (ALIGN PO) Take  by mouth. Physical Examination     She declines a full skin examination. The following were examined and determined to be normal: Psych/Neuro, Scalp/hair, Head/face, Conjunctivae/eyelids, Gums/teeth/lips, Neck, RLE and LLE. The following were examined and determined to be abnormal: None. Well-appearing. 1.  Left side of the forehead with a small round stuck on appearing verrucous tan papule. 2.  Scattered on the face and shins are multiple round smooth brown macules. 3.  Clear. Assessment and Plan     1. SK (seborrheic keratosis)     Reassurance. 2. Solar lentigines    Monitor for change. 3. Chronic dermatitis -doing well right now    Clobetasol cream twice daily as needed for recurrences.            --Dionna Agustin MD

## 2022-02-21 RX ORDER — ONDANSETRON 4 MG/1
TABLET, FILM COATED ORAL
Qty: 30 TABLET | Refills: 1 | Status: SHIPPED | OUTPATIENT
Start: 2022-02-21 | End: 2022-08-25

## 2022-02-21 RX ORDER — SUMATRIPTAN 50 MG/1
TABLET, FILM COATED ORAL
Qty: 12 TABLET | Refills: 2 | Status: SHIPPED | OUTPATIENT
Start: 2022-02-21

## 2022-04-04 NOTE — PROGRESS NOTES
Date of Visit:  2022    CC: Ekta Watkins (: 1950) is a 66 y.o. female, established patient, here for evaluation/re-evaluation of the following medical concerns:    ASSESSMENT/PLAN:  Essential hypertension  Assessment & Plan:  Asymptomatic, but home readings sub-optimal today. She will check her BP at home more consistently and call or message with new readings in 2-3 weeks. Will adjust BP medications, if indicated. Orders:  -     Basic Metabolic Panel; Future  Pure hypercholesterolemia  Assessment & Plan:  Importance of continued lifestyle changes stressed to help prevent need for cholesterol medication in the future. Cardiac calcium score , repeat in 3 years to ensure stability. Hypothyroidism due to acquired atrophy of thyroid  Assessment & Plan:  Constipation is likely multifactorial, but will adjust levothyroxine dose if indicated by lab results. Orders:  -     TSH; Future  Hiatal hernia  Assessment & Plan:  Adequately controlled on 15 mg Prevacid alternating with 30 mg qd, but she does not feel that she can decrease this medication any further. Reflux precautions discussed. Major depressive disorder with single episode, in full remission [facility]  Assessment & Plan:  Struggling with more frequent worry and intrusive thoughts related to politics and world crises. Will try increasing Celexa to 15-20 mg qd. If no improvement, will consider counseling. Age-related osteoporosis without current pathological fracture  Assessment & Plan:  Due for Reclast, which will be infused at [facility]. Pre-infusion labs ordered. Continue vitamin D supplement at current dose and high calcium diet. Weight-bearing exercise encouraged. Return in about 7 months (around 2022) for MEDICARE AW.      Patient-Reported Vitals 2022   Patient-Reported Weight 146   Patient-Reported Height 54   Patient-Reported Systolic 440   Patient-Reported Diastolic 78   Patient-Reported Pulse 72 Patient-Reported Temperature 97.8   Patient-Reported SpO2 96        Wt Readings from Last 3 Encounters:   12/08/21 146 lb (66.2 kg)   11/05/21 147 lb (66.7 kg)   04/29/21 146 lb (66.2 kg)     BP Readings from Last 3 Encounters:   11/05/21 110/78   04/29/21 124/80   04/16/21 (!) 149/76     Estimated body mass index is 24.67 kg/m² as calculated from the following:    Height as of 12/8/21: 5' 4.5\" (1.638 m). Weight as of 12/8/21: 146 lb (66.2 kg). HPI  Hypertension:  Home blood pressure monitoring: not checking very often. She is adherent to a low sodium diet. Patient denies chest pain, shortness of breath, headache, lightheadedness and palpitations.  Antihypertensive medication side effects: no medication side effects noted.  Use of agents associated with hypertension: none. CMP 12/9/21 unremarkable.     Hyperlipidemia:  Patient is following the Mediterranean Diet- more consistent. Exercise:  Minimal walking. Cardiac calcium score 17- 5/21. , HDL 68, TG 87- 12/9/21.        Hypothyroidism: Recent symptoms: constipation. She denies fatigue, hair loss, weight changes, dry skin, diarrhea. Patient is taking her medication consistently on an empty stomach. TSH 0.10- 12/9/21     GI Disorder:  Patient presents for follow-up of GERD- chronic.  Current symptoms include intermittent dyspepsia, nausea.  Symptoms are stable.  Patient denies heartburn, dysphagia, heartburn, vomiting, abdominal pain, abdominal bloating, constipation, diarrhea, melena and hematochezia.  Current treatment includes: proton pump inhibitor- Prevacid 15 mg alternating with 30 mg qod, Align daily, Miralax every other day.  Medication side effects:  none.  Recent diagnostic testing: EGD, colonoscopy 7/19- 1 benign colon polyp, mild gastritis and esophagitis. Mood Disorder:  Patient presents for follow-up of depression. Current complaints include: excessive worry.   She denies anhedonia, depressed mood, tearfulness, feelings of hopelessness, insomnia, difficulty concentrating, irritability and excessive worry. Symptoms/signs of lili: none. External stressors: nothing new. Current treatment includes: Celexa- 10 mg qd. Medication side effects: none. ROS  As documented above    Physical Exam  Constitutional:       General: She is not in acute distress. Appearance: She is well-developed. HENT:      Head: Normocephalic and atraumatic. Mouth/Throat:      Lips: No lesions. Neck:      Comments: No visible mass  Pulmonary:      Effort: Pulmonary effort is normal. No respiratory distress. Skin:     Comments: No rash, erythema or other discoloration on facial skin and exposed areas of neck and upper extremites    Neurological:      Mental Status: She is alert. Comments: No facial asymmetry (cranial nerve 7 motor function) or gaze palsy   Psychiatric:         Attention and Perception: Attention normal.         Mood and Affect: Mood normal.         Speech: Speech normal.         Behavior: Behavior normal.         Thought Content: Thought content normal.         Cognition and Memory: Cognition normal.       On this date 4/5/2022 I have spent 30 minutes reviewing previous notes, test results and face to face with the patient discussing the diagnosis and importance of compliance with the treatment plan as well as documenting on the day of the visit. Ky Hernandez, was evaluated through a synchronous (real-time) audio-video encounter. The patient (or guardian if applicable) is aware that this is a billable service, which includes applicable co-pays. This Virtual Visit was conducted with patient's (and/or legal guardian's) consent. The visit was conducted pursuant to the emergency declaration under the [address] [place] [address], [address] [place] [address] authority and the [employer] and [employer] General Act.   Patient identification was verified, and a caregiver was present when appropriate. The patient was located at home in a state where the provider was licensed to provide care. --Nancy Lowery MD on 4/5/2022 at 9:23 AM    An electronic signature was used to authenticate this note.

## 2022-04-05 ENCOUNTER — TELEMEDICINE (OUTPATIENT)
Dept: INTERNAL MEDICINE CLINIC | Age: 72
End: 2022-04-05
Payer: MEDICARE

## 2022-04-05 DIAGNOSIS — M81.0 AGE-RELATED OSTEOPOROSIS WITHOUT CURRENT PATHOLOGICAL FRACTURE: ICD-10-CM

## 2022-04-05 DIAGNOSIS — K44.9 HIATAL HERNIA: Chronic | ICD-10-CM

## 2022-04-05 DIAGNOSIS — E03.4 HYPOTHYROIDISM DUE TO ACQUIRED ATROPHY OF THYROID: ICD-10-CM

## 2022-04-05 DIAGNOSIS — I10 ESSENTIAL HYPERTENSION: Primary | ICD-10-CM

## 2022-04-05 DIAGNOSIS — E78.00 PURE HYPERCHOLESTEROLEMIA: ICD-10-CM

## 2022-04-05 DIAGNOSIS — F32.5 MAJOR DEPRESSIVE DISORDER WITH SINGLE EPISODE, IN FULL REMISSION (HCC): Chronic | ICD-10-CM

## 2022-04-05 PROCEDURE — 99214 OFFICE O/P EST MOD 30 MIN: CPT | Performed by: INTERNAL MEDICINE

## 2022-04-05 RX ORDER — LATANOPROST 50 UG/ML
1 SOLUTION/ DROPS OPHTHALMIC NIGHTLY
COMMUNITY
Start: 2022-01-31

## 2022-04-05 NOTE — ASSESSMENT & PLAN NOTE
Struggling with more frequent worry and intrusive thoughts related to politics and world crises. Will try increasing Celexa to 15-20 mg qd. If no improvement, will consider counseling.

## 2022-04-05 NOTE — ASSESSMENT & PLAN NOTE
Due for Reclast, which will be infused at [facility]. Pre-infusion labs ordered. Continue vitamin D supplement at current dose and high calcium diet. Weight-bearing exercise encouraged.

## 2022-04-05 NOTE — ASSESSMENT & PLAN NOTE
Adequately controlled on 15 mg Prevacid alternating with 30 mg qd, but she does not feel that she can decrease this medication any further. Reflux precautions discussed.

## 2022-04-05 NOTE — ASSESSMENT & PLAN NOTE
Asymptomatic, but home readings sub-optimal today. She will check her BP at home more consistently and call or message with new readings in 2-3 weeks. Will adjust BP medications, if indicated.

## 2022-04-12 ENCOUNTER — PATIENT MESSAGE (OUTPATIENT)
Dept: INTERNAL MEDICINE CLINIC | Age: 72
End: 2022-04-12

## 2022-04-12 RX ORDER — CITALOPRAM 10 MG/1
TABLET ORAL
Qty: 90 TABLET | Refills: 1 | Status: SHIPPED | OUTPATIENT
Start: 2022-04-12 | End: 2022-04-14

## 2022-04-12 NOTE — TELEPHONE ENCOUNTER
From: Iam Crane  To: Dr. Emily Leblanc  Sent: 4/12/2022 4:42 PM EDT  Subject: Drug dosage increase    Dr. Fausto Tran, At my last visit we decided I would increase my dosage of citalopram from 10 to 15 mg. I have been breaking a tablet in half to do this. I feel like the increase is helping me and Im almost due for a refill. Could you please send an increase to 15 mg tablet to my pharmacy at [facility] [phone]? Thanks! I am running out. Gigi  I will send you my blood pressure readings soon.

## 2022-04-14 RX ORDER — CITALOPRAM 10 MG/1
TABLET ORAL
Qty: 90 TABLET | Refills: 1 | Status: SHIPPED
Start: 2022-04-14 | End: 2022-04-21 | Stop reason: DRUGHIGH

## 2022-04-21 RX ORDER — CITALOPRAM 10 MG/1
15 TABLET ORAL DAILY
Qty: 135 TABLET | Refills: 1 | Status: SHIPPED | OUTPATIENT
Start: 2022-04-21 | End: 2022-06-10 | Stop reason: SDUPTHER

## 2022-04-28 RX ORDER — ZOLEDRONIC ACID 5 MG/100ML
5 INJECTION, SOLUTION INTRAVENOUS ONCE
Status: CANCELLED | OUTPATIENT
Start: 2022-04-28 | End: 2022-04-28

## 2022-04-28 RX ORDER — SODIUM CHLORIDE 0.9 % (FLUSH) 0.9 %
5-40 SYRINGE (ML) INJECTION PRN
Status: CANCELLED | OUTPATIENT
Start: 2022-04-28

## 2022-04-28 RX ORDER — SODIUM CHLORIDE 9 MG/ML
INJECTION, SOLUTION INTRAVENOUS ONCE
Status: CANCELLED | OUTPATIENT
Start: 2022-04-28 | End: 2022-04-28

## 2022-05-02 DIAGNOSIS — I10 ESSENTIAL HYPERTENSION: ICD-10-CM

## 2022-05-02 DIAGNOSIS — E03.4 HYPOTHYROIDISM DUE TO ACQUIRED ATROPHY OF THYROID: ICD-10-CM

## 2022-05-02 LAB
ANION GAP SERPL CALCULATED.3IONS-SCNC: 13 MMOL/L (ref 3–16)
BUN BLDV-MCNC: 13 MG/DL (ref 7–20)
CALCIUM SERPL-MCNC: 9.6 MG/DL (ref 8.3–10.6)
CHLORIDE BLD-SCNC: 97 MMOL/L (ref 99–110)
CO2: 25 MMOL/L (ref 21–32)
CREAT SERPL-MCNC: <0.5 MG/DL (ref 0.6–1.2)
GFR AFRICAN AMERICAN: >60
GFR NON-AFRICAN AMERICAN: >60
GLUCOSE BLD-MCNC: 88 MG/DL (ref 70–99)
POTASSIUM SERPL-SCNC: 4.6 MMOL/L (ref 3.5–5.1)
SODIUM BLD-SCNC: 135 MMOL/L (ref 136–145)
TSH SERPL DL<=0.05 MIU/L-ACNC: 0.08 UIU/ML (ref 0.27–4.2)

## 2022-05-03 RX ORDER — LANSOPRAZOLE 30 MG/1
CAPSULE, DELAYED RELEASE ORAL
Qty: 30 CAPSULE | Refills: 5 | Status: SHIPPED | OUTPATIENT
Start: 2022-05-03 | End: 2022-10-26

## 2022-05-03 RX ORDER — LEVOTHYROXINE SODIUM 112 UG/1
112 TABLET ORAL DAILY
Qty: 30 TABLET | Refills: 5 | Status: SHIPPED | OUTPATIENT
Start: 2022-05-03 | End: 2022-10-26

## 2022-05-10 ENCOUNTER — HOSPITAL ENCOUNTER (OUTPATIENT)
Dept: ONCOLOGY | Age: 72
Setting detail: INFUSION SERIES
Discharge: HOME | End: 2022-05-10
Payer: MEDICARE

## 2022-05-10 VITALS
TEMPERATURE: 99.2 F | RESPIRATION RATE: 16 BRPM | HEART RATE: 58 BPM | DIASTOLIC BLOOD PRESSURE: 60 MMHG | SYSTOLIC BLOOD PRESSURE: 132 MMHG | WEIGHT: 145 LBS | BODY MASS INDEX: 24.5 KG/M2

## 2022-05-10 DIAGNOSIS — M81.0 AGE-RELATED OSTEOPOROSIS WITHOUT CURRENT PATHOLOGICAL FRACTURE: Primary | ICD-10-CM

## 2022-05-10 PROCEDURE — 2580000003 HC RX 258: Performed by: INTERNAL MEDICINE

## 2022-05-10 PROCEDURE — 96365 THER/PROPH/DIAG IV INF INIT: CPT

## 2022-05-10 PROCEDURE — 99211 OFF/OP EST MAY X REQ PHY/QHP: CPT

## 2022-05-10 PROCEDURE — 6360000002 HC RX W HCPCS: Performed by: INTERNAL MEDICINE

## 2022-05-10 RX ORDER — SODIUM CHLORIDE 0.9 % (FLUSH) 0.9 %
5-40 SYRINGE (ML) INJECTION PRN
OUTPATIENT
Start: 2022-05-10

## 2022-05-10 RX ORDER — SODIUM CHLORIDE 9 MG/ML
INJECTION, SOLUTION INTRAVENOUS ONCE
Status: CANCELLED | OUTPATIENT
Start: 2022-05-10 | End: 2022-05-10

## 2022-05-10 RX ORDER — SODIUM CHLORIDE 0.9 % (FLUSH) 0.9 %
5-40 SYRINGE (ML) INJECTION PRN
Status: DISCONTINUED | OUTPATIENT
Start: 2022-05-10 | End: 2022-05-11 | Stop reason: HOSPADM

## 2022-05-10 RX ORDER — SODIUM CHLORIDE 9 MG/ML
INJECTION, SOLUTION INTRAVENOUS ONCE
Status: COMPLETED | OUTPATIENT
Start: 2022-05-10 | End: 2022-05-10

## 2022-05-10 RX ORDER — ZOLEDRONIC ACID 5 MG/100ML
5 INJECTION, SOLUTION INTRAVENOUS ONCE
Status: COMPLETED | OUTPATIENT
Start: 2022-05-10 | End: 2022-05-10

## 2022-05-10 RX ORDER — ZOLEDRONIC ACID 5 MG/100ML
5 INJECTION, SOLUTION INTRAVENOUS ONCE
Status: CANCELLED | OUTPATIENT
Start: 2022-05-10 | End: 2022-05-10

## 2022-05-10 RX ADMIN — ZOLEDRONIC ACID 5 MG: 0.05 INJECTION, SOLUTION INTRAVENOUS at 09:31

## 2022-05-10 RX ADMIN — SODIUM CHLORIDE: 9 INJECTION, SOLUTION INTRAVENOUS at 09:30

## 2022-05-10 NOTE — PROGRESS NOTES
[facility] Reclast infusion. BP elevated. Pt states she has been monitoring and recording it at home to see if she needs her medication adjusted. Pt denies complaints. IV access obtained. Infusion administered. VSS upon completion. Pt discharged home.

## 2022-05-13 RX ORDER — FLUTICASONE PROPIONATE 50 MCG
SPRAY, SUSPENSION (ML) NASAL
Qty: 1 EACH | Refills: 11 | Status: SHIPPED | OUTPATIENT
Start: 2022-05-13

## 2022-05-31 ENCOUNTER — TELEPHONE (OUTPATIENT)
Dept: ADMINISTRATIVE | Age: 72
End: 2022-05-31

## 2022-05-31 NOTE — TELEPHONE ENCOUNTER
Submitted PA for Lansoprazole 30MG dr capsules  Via CMM Key: [id number] STATUS: \"Drug is covered by current benefit plan. No further PA activity needed. \"    If this requires a response please respond to the pool. [address]). Please advise patient thank you.

## 2022-06-10 ENCOUNTER — PATIENT MESSAGE (OUTPATIENT)
Dept: INTERNAL MEDICINE CLINIC | Age: 72
End: 2022-06-10

## 2022-06-10 RX ORDER — CITALOPRAM 20 MG/1
20 TABLET ORAL DAILY
Qty: 30 TABLET | Refills: 5 | Status: SHIPPED | OUTPATIENT
Start: 2022-06-10 | End: 2022-10-14

## 2022-06-10 NOTE — TELEPHONE ENCOUNTER
From: Reena Crane  To: Dr. Blake Simms  Sent: 6/10/2022 6:51 AM EDT  Subject: Dosage increase    Dr. Ynes Moralez, After trying an increase of citalopram from 10 to 15 mg. Since April, I would like to try the 20 mg since they dont make a 15 mg dose. We talked about this at my last appointment. Could you please increase this dosage next week when you are back at the office? Thank you! Eric Joshi

## 2022-06-20 RX ORDER — CANDESARTAN 16 MG/1
TABLET ORAL
Qty: 90 TABLET | Refills: 0 | Status: SHIPPED | OUTPATIENT
Start: 2022-06-20 | End: 2022-09-21 | Stop reason: SDUPTHER

## 2022-06-20 NOTE — TELEPHONE ENCOUNTER
Last appointment: 4/5/2022  Next appointment: 11/8/2022  Last refill: 12/21/21   please advise as DOD.  THank you

## 2022-06-27 RX ORDER — METOPROLOL SUCCINATE 25 MG/1
TABLET, EXTENDED RELEASE ORAL
Qty: 90 TABLET | Refills: 1 | Status: SHIPPED | OUTPATIENT
Start: 2022-06-27

## 2022-07-19 ENCOUNTER — TELEPHONE (OUTPATIENT)
Dept: DERMATOLOGY | Age: 72
End: 2022-07-19

## 2022-07-19 NOTE — TELEPHONE ENCOUNTER
Pt is concerned about a growth on her leg. She noticed it last night. She says that it is round and it is sticking out. She is worried that it is a melanoma. Should would like to be seen before she leaves for vacation in two weeks.   She does have another doctor appt on Thurs this week at 9 am.

## 2022-07-20 ENCOUNTER — OFFICE VISIT (OUTPATIENT)
Dept: DERMATOLOGY | Age: 72
End: 2022-07-20
Payer: MEDICARE

## 2022-07-20 DIAGNOSIS — D69.2 PURPURA (HCC): Primary | ICD-10-CM

## 2022-07-20 PROCEDURE — 1123F ACP DISCUSS/DSCN MKR DOCD: CPT | Performed by: DERMATOLOGY

## 2022-07-20 PROCEDURE — 99212 OFFICE O/P EST SF 10 MIN: CPT | Performed by: DERMATOLOGY

## 2022-07-20 NOTE — PROGRESS NOTES
tablet TAKE ONE TABLET BY MOUTH AT ONSET OF HEADACHE; MAY REPEAT ONE TABLET IN 2 HOURS IF NEEDED. 12 tablet 2    ondansetron (ZOFRAN) 4 MG tablet TAKE ONE TABLET BY MOUTH DAILY AS NEEDED FOR VOMITING 30 tablet 1    clobetasol (TEMOVATE) 0.05 % cream Apply to affected areas on the legs twice daily if needed for recurrence of dermatitis. 60 g 1    Calcium Citrate-Vitamin D (CALCIUM + D PO) Take by mouth daily       polyethylene glycol (GLYCOLAX) 17 g packet Take 17 g by mouth every other day      Aspirin-Acetaminophen-Caffeine (EXCEDRIN MIGRAINE PO) Take by mouth      Misc Intestinal Lou Regulat (ALIGN PO) Take  by mouth. Physical Examination       Well appearing. Left posterior lower leg - round purpuric macule. Assessment and Plan     1. Purpura     Reassurance.            --Frank Cary MD

## 2022-07-27 RX ORDER — PREDNISONE 10 MG/1
TABLET ORAL
Qty: 40 TABLET | Refills: 0 | Status: SHIPPED | OUTPATIENT
Start: 2022-07-27 | End: 2022-08-12

## 2022-08-01 RX ORDER — CLOBETASOL PROPIONATE 0.5 MG/G
CREAM TOPICAL
Qty: 60 G | Refills: 1 | Status: SHIPPED | OUTPATIENT
Start: 2022-08-01

## 2022-08-25 RX ORDER — ONDANSETRON 4 MG/1
TABLET, FILM COATED ORAL
Qty: 30 TABLET | Refills: 2 | Status: SHIPPED | OUTPATIENT
Start: 2022-08-25

## 2022-08-29 ENCOUNTER — TELEPHONE (OUTPATIENT)
Dept: INTERNAL MEDICINE CLINIC | Age: 72
End: 2022-08-29

## 2022-08-31 NOTE — PROGRESS NOTES
Date of Visit:  2022    CC: Tung Kessler (: 1950) is a 67 y.o. female, established patient, here for evaluation/re-evaluation of the following medical concerns:    ASSESSMENT/PLAN:  Dermatitis  Assessment & Plan:  Etiology unclear. Labs ordered to assess for systemic process- if elevated PATRICK and inflammatory markers, will refer to rheumatology. If eosinophils elevated, consider allergy referral.  If labs unremarkable, she will follow-up with dermatology. For now, she will try taking daily Zyrtec to address the pruritic component of the rash. Orders:  -     Comprehensive Metabolic Panel; Future  -     Sedimentation Rate; Future  -     C-Reactive Protein; Future  -     PATRICK Reflex to Antibody Cascade; Future  Lymphocytopenia  Assessment & Plan:  Negative hematology work-up, but if could be related to rash if autoimmune etiology identified. Orders:  -     CBC with Auto Differential; Future    Return if symptoms worsen or fail to improve. Patient-Reported Vitals 2022   Patient-Reported Weight 140   Patient-Reported Height 54    Patient-Reported Systolic 035   Patient-Reported Diastolic 66   Patient-Reported Pulse 66   Patient-Reported Temperature 97.7   Patient-Reported SpO2 86        Wt Readings from Last 3 Encounters:   05/10/22 145 lb (65.8 kg)   21 146 lb (66.2 kg)   21 147 lb (66.7 kg)     BP Readings from Last 3 Encounters:   05/10/22 132/60   21 110/78   21 124/80     Estimated body mass index is 24.5 kg/m² as calculated from the following:    Height as of 21: 5' 4.5\" (1.638 m). Weight as of 5/10/22: 145 lb (65.8 kg). HPI  Rash: Patient complains of a 3 month history of a scattered rash. Rash first presented on the left back and spread to lower legs, arms. Rash is pink and is pruritic, raised, and macular. Associated symptoms include none. Patient has tried clobetasol topical, prednisone taper- stopped after 6 days due to GI upset.   New exposures: none. Patient has not had contacts with similar symptoms. Prior history of similar symptoms: yes - only on lower legs. Has seen Dr. Laurence Cuello, dermatology, who has prescribed her topical and oral steroids. Rash worse with sun exposure and has intermittent erythema of left face. ROS  As documented above    Physical Exam  Constitutional:       General: She is not in acute distress. Appearance: She is well-developed. HENT:      Head: Normocephalic and atraumatic. Mouth/Throat:      Lips: No lesions. Neck:      Comments: No visible mass  Pulmonary:      Effort: Pulmonary effort is normal. No respiratory distress. Skin:     Comments: No rash, erythema or other discoloration on facial skin and exposed areas of neck and upper extremites    Neurological:      Mental Status: She is alert. Comments: No facial asymmetry (cranial nerve 7 motor function) or gaze palsy   Psychiatric:         Attention and Perception: Attention normal.         Mood and Affect: Mood normal.         Speech: Speech normal.         Behavior: Behavior normal.         Thought Content: Thought content normal.         Cognition and Memory: Cognition normal.         Geno Cruz was evaluated through a synchronous (real-time) audio-video encounter. The patient (or guardian if applicable) is aware that this is a billable service, which includes applicable co-pays. This Virtual Visit was conducted with patient's (and/or legal guardian's) consent. The visit was conducted pursuant to the emergency declaration under the [address] [place] [address], [address] [place] [address] authority and the [employer] and [employer] General Act. Patient identification was verified, and a caregiver was present when appropriate. The patient was located at Home: [address] [place] [address] [address] [address].    Provider was located at Home ([facility]): Ashu Juarez MD on 9/1/2022 at 12:48 PM    An electronic signature was used to authenticate this note.

## 2022-09-01 ENCOUNTER — TELEMEDICINE (OUTPATIENT)
Dept: INTERNAL MEDICINE CLINIC | Age: 72
End: 2022-09-01
Payer: MEDICARE

## 2022-09-01 DIAGNOSIS — D72.810 LYMPHOCYTOPENIA: ICD-10-CM

## 2022-09-01 DIAGNOSIS — L30.9 DERMATITIS: Primary | ICD-10-CM

## 2022-09-01 PROCEDURE — 99214 OFFICE O/P EST MOD 30 MIN: CPT | Performed by: INTERNAL MEDICINE

## 2022-09-01 PROCEDURE — 1123F ACP DISCUSS/DSCN MKR DOCD: CPT | Performed by: INTERNAL MEDICINE

## 2022-09-01 RX ORDER — HYDROCODONE BITARTRATE AND ACETAMINOPHEN 5; 325 MG/1; MG/1
TABLET ORAL PRN
COMMUNITY
Start: 2022-07-22

## 2022-09-01 RX ORDER — ACETAMINOPHEN 500 MG
500 TABLET ORAL 2 TIMES DAILY
COMMUNITY

## 2022-09-01 ASSESSMENT — PATIENT HEALTH QUESTIONNAIRE - PHQ9
9. THOUGHTS THAT YOU WOULD BE BETTER OFF DEAD, OR OF HURTING YOURSELF: 0
SUM OF ALL RESPONSES TO PHQ9 QUESTIONS 1 & 2: 0
6. FEELING BAD ABOUT YOURSELF - OR THAT YOU ARE A FAILURE OR HAVE LET YOURSELF OR YOUR FAMILY DOWN: 0
5. POOR APPETITE OR OVEREATING: 0
10. IF YOU CHECKED OFF ANY PROBLEMS, HOW DIFFICULT HAVE THESE PROBLEMS MADE IT FOR YOU TO DO YOUR WORK, TAKE CARE OF THINGS AT HOME, OR GET ALONG WITH OTHER PEOPLE: 0
8. MOVING OR SPEAKING SO SLOWLY THAT OTHER PEOPLE COULD HAVE NOTICED. OR THE OPPOSITE, BEING SO FIGETY OR RESTLESS THAT YOU HAVE BEEN MOVING AROUND A LOT MORE THAN USUAL: 0
SUM OF ALL RESPONSES TO PHQ QUESTIONS 1-9: 1
1. LITTLE INTEREST OR PLEASURE IN DOING THINGS: 0
SUM OF ALL RESPONSES TO PHQ QUESTIONS 1-9: 1
SUM OF ALL RESPONSES TO PHQ QUESTIONS 1-9: 1
3. TROUBLE FALLING OR STAYING ASLEEP: 1
7. TROUBLE CONCENTRATING ON THINGS, SUCH AS READING THE NEWSPAPER OR WATCHING TELEVISION: 0
2. FEELING DOWN, DEPRESSED OR HOPELESS: 0
SUM OF ALL RESPONSES TO PHQ QUESTIONS 1-9: 1
4. FEELING TIRED OR HAVING LITTLE ENERGY: 0

## 2022-09-01 NOTE — ASSESSMENT & PLAN NOTE
Etiology unclear. Labs ordered to assess for systemic process- if elevated PATRICK and inflammatory markers, will refer to rheumatology. If eosinophils elevated, consider allergy referral.  If labs unremarkable, she will follow-up with dermatology. For now, she will try taking daily Zyrtec to address the pruritic component of the rash.

## 2022-09-02 DIAGNOSIS — L30.9 DERMATITIS: ICD-10-CM

## 2022-09-02 DIAGNOSIS — E03.4 HYPOTHYROIDISM DUE TO ACQUIRED ATROPHY OF THYROID: ICD-10-CM

## 2022-09-02 DIAGNOSIS — D72.810 LYMPHOCYTOPENIA: ICD-10-CM

## 2022-09-02 LAB
A/G RATIO: 1.8 (ref 1.1–2.2)
ALBUMIN SERPL-MCNC: 4.3 G/DL (ref 3.4–5)
ALP BLD-CCNC: 65 U/L (ref 40–129)
ALT SERPL-CCNC: 18 U/L (ref 10–40)
ANION GAP SERPL CALCULATED.3IONS-SCNC: 10 MMOL/L (ref 3–16)
AST SERPL-CCNC: 25 U/L (ref 15–37)
BASOPHILS ABSOLUTE: 0 K/UL (ref 0–0.2)
BASOPHILS RELATIVE PERCENT: 0.7 %
BILIRUB SERPL-MCNC: 0.4 MG/DL (ref 0–1)
BUN BLDV-MCNC: 8 MG/DL (ref 7–20)
C-REACTIVE PROTEIN: <3 MG/L (ref 0–5.1)
CALCIUM SERPL-MCNC: 9.5 MG/DL (ref 8.3–10.6)
CHLORIDE BLD-SCNC: 98 MMOL/L (ref 99–110)
CO2: 27 MMOL/L (ref 21–32)
CREAT SERPL-MCNC: <0.5 MG/DL (ref 0.6–1.2)
EOSINOPHILS ABSOLUTE: 0.1 K/UL (ref 0–0.6)
EOSINOPHILS RELATIVE PERCENT: 3 %
GFR AFRICAN AMERICAN: >60
GFR NON-AFRICAN AMERICAN: >60
GLUCOSE BLD-MCNC: 82 MG/DL (ref 70–99)
HCT VFR BLD CALC: 36.1 % (ref 36–48)
HEMOGLOBIN: 12.3 G/DL (ref 12–16)
LYMPHOCYTES ABSOLUTE: 0.8 K/UL (ref 1–5.1)
LYMPHOCYTES RELATIVE PERCENT: 28.7 %
MCH RBC QN AUTO: 31.6 PG (ref 26–34)
MCHC RBC AUTO-ENTMCNC: 34 G/DL (ref 31–36)
MCV RBC AUTO: 93.1 FL (ref 80–100)
MONOCYTES ABSOLUTE: 0.4 K/UL (ref 0–1.3)
MONOCYTES RELATIVE PERCENT: 13.5 %
NEUTROPHILS ABSOLUTE: 1.6 K/UL (ref 1.7–7.7)
NEUTROPHILS RELATIVE PERCENT: 54.1 %
PDW BLD-RTO: 13.9 % (ref 12.4–15.4)
PLATELET # BLD: 229 K/UL (ref 135–450)
PMV BLD AUTO: 8.2 FL (ref 5–10.5)
POTASSIUM SERPL-SCNC: 4.3 MMOL/L (ref 3.5–5.1)
RBC # BLD: 3.88 M/UL (ref 4–5.2)
SEDIMENTATION RATE, ERYTHROCYTE: 16 MM/HR (ref 0–30)
SODIUM BLD-SCNC: 135 MMOL/L (ref 136–145)
TOTAL PROTEIN: 6.7 G/DL (ref 6.4–8.2)
TSH REFLEX: 0.9 UIU/ML (ref 0.27–4.2)
WBC # BLD: 2.9 K/UL (ref 4–11)

## 2022-09-03 LAB — ANTI-NUCLEAR ANTIBODY (ANA): NEGATIVE

## 2022-09-08 ENCOUNTER — OFFICE VISIT (OUTPATIENT)
Dept: DERMATOLOGY | Age: 72
End: 2022-09-08
Payer: MEDICARE

## 2022-09-08 DIAGNOSIS — L30.9 CHRONIC DERMATITIS: ICD-10-CM

## 2022-09-08 DIAGNOSIS — R21 RASH: Primary | ICD-10-CM

## 2022-09-08 PROCEDURE — 99213 OFFICE O/P EST LOW 20 MIN: CPT | Performed by: DERMATOLOGY

## 2022-09-08 PROCEDURE — 1123F ACP DISCUSS/DSCN MKR DOCD: CPT | Performed by: DERMATOLOGY

## 2022-09-08 RX ORDER — FEXOFENADINE HYDROCHLORIDE 60 MG/1
60 TABLET, FILM COATED ORAL DAILY
Qty: 30 TABLET | Refills: 1 | Status: SHIPPED | OUTPATIENT
Start: 2022-09-08

## 2022-09-08 NOTE — PROGRESS NOTES
[facility]  Akil Butler MD  [address]  [id number]    67 y.o. female     Date of Visit: 9/8/2022    Chief Complaint: rash    History of Present Illness:    1. She presents today for few week history of a markedly pruritic eruption on the trunk and extremities. She mostly notices the itching at night. She describes hive-like lesions that resolved by morning. 2.  She has a history of chronic dermatitis of the shins (pretibial papular dermatitis). It typically is well controlled with intermittent use of clobetasol cream.  She complains of 1 patch today in the left shin. Review of Systems:  Gen: Feels well, good sense of health. Past Medical History, Family History, Surgical History, Medications and Allergies reviewed. Past Medical History:   Diagnosis Date    Allergic rhinitis     Anxiety     Depression     Hiatal hernia     Hyperlipidemia     Hypertension     Hypothyroidism     Migraine headache     Osteoporosis     Overactive bladder     Rupture of tendon of foot region     Right    Spondyloarthropathy     L5-S1    Tear of meniscus of left knee 9/5/2014     Past Surgical History:   Procedure Laterality Date    ANKLE SURGERY  1/17/11    Right gastroc lenghtening, proximal tibial bone graft triple arthrodesis of ankle    DILATION AND CURETTAGE OF UTERUS      x2    KNEE ARTHROSCOPY Left 1/28/15    TEAR DUCT SURGERY  2002       Allergies   Allergen Reactions    Cephalexin Hives     Outpatient Medications Marked as Taking for the 9/8/22 encounter (Office Visit) with Supriya Romero MD   Medication Sig Dispense Refill    fexofenadine (ALLEGRA) 60 MG tablet Take 1 tablet by mouth daily 30 tablet 1    acetaminophen (TYLENOL) 500 MG tablet Take 500 mg by mouth 2 times daily      HYDROcodone-acetaminophen (NORCO) 5-325 MG per tablet as needed.       ondansetron (ZOFRAN) 4 MG tablet TAKE ONE TABLET BY MOUTH DAILY AS NEEDED FOR VOMITING 30 tablet 2 clobetasol (TEMOVATE) 0.05 % cream APPLY TO AFFECTED AREA(S) ON THE LEGS TWO TIMES A DAY AS NEEDED FOR RECURRENCE OF DERMATITIS 60 g 1    metoprolol succinate (TOPROL XL) 25 MG extended release tablet TAKE ONE TABLET BY MOUTH DAILY 90 tablet 1    candesartan (ATACAND) 16 MG tablet TAKE ONE TABLET BY MOUTH DAILY 90 tablet 0    citalopram (CELEXA) 20 mg tablet Take 1 tablet by mouth daily (Patient taking differently: Take 10 mg by mouth daily) 30 tablet 5    fluticasone (FLONASE) 50 MCG/ACT nasal spray SPRAY ONE SPRAY IN EACH NOSTRIL ONCE DAILY 1 each 11    lansoprazole (PREVACID) 30 MG delayed release capsule TAKE ONE CAPSULE BY MOUTH DAILY 30 capsule 5    levothyroxine (SYNTHROID) 112 MCG tablet Take 1 tablet by mouth daily 30 tablet 5    latanoprost (XALATAN) 0.005 % ophthalmic solution Place 1 drop into the right eye nightly       SUMAtriptan (IMITREX) 50 MG tablet TAKE ONE TABLET BY MOUTH AT ONSET OF HEADACHE; MAY REPEAT ONE TABLET IN 2 HOURS IF NEEDED. 12 tablet 2    Calcium Citrate-Vitamin D (CALCIUM + D PO) Take by mouth daily       polyethylene glycol (GLYCOLAX) 17 g packet Take 17 g by mouth every other day      Misc Intestinal Lou Regulat (ALIGN PO) Take  by mouth. Physical Examination       The following were examined and determined to be normal: Psych/Neuro, Scalp/hair, Head/face, Neck, Back, RUE, and LUE. The following were examined and determined to be abnormal: RLE and LLE. Well appearing. 1.  Anterior portion of the thighs with few round smooth faint pink papules. 2.  Left lower shin with some scaling and fissuring along with erythema. Assessment and Plan     1. Rash - likely new onset/acute urticaria    She has had sensitivity to antihistamines in the past.    Start fexofenadine 60 mg daily. 2. Chronic dermatitis of the legs - mild    Clobetasol cream 1-2 times daily until improved. Patient to provide update with how she's doing in 2 weeks.      --[employer] Taras Hall MD

## 2022-09-21 RX ORDER — CANDESARTAN 16 MG/1
TABLET ORAL
Qty: 90 TABLET | Refills: 1 | Status: SHIPPED | OUTPATIENT
Start: 2022-09-21

## 2022-10-14 RX ORDER — CITALOPRAM 20 MG/1
TABLET ORAL
Qty: 90 TABLET | Refills: 1 | Status: SHIPPED | OUTPATIENT
Start: 2022-10-14

## 2022-10-26 RX ORDER — LEVOTHYROXINE SODIUM 112 UG/1
TABLET ORAL
Qty: 30 TABLET | Refills: 5 | Status: SHIPPED | OUTPATIENT
Start: 2022-10-26

## 2022-10-26 RX ORDER — LANSOPRAZOLE 30 MG/1
CAPSULE, DELAYED RELEASE ORAL
Qty: 60 CAPSULE | Refills: 5 | Status: SHIPPED | OUTPATIENT
Start: 2022-10-26

## 2022-11-08 ENCOUNTER — OFFICE VISIT (OUTPATIENT)
Dept: INTERNAL MEDICINE CLINIC | Age: 72
End: 2022-11-08
Payer: MEDICARE

## 2022-11-08 VITALS
OXYGEN SATURATION: 98 % | SYSTOLIC BLOOD PRESSURE: 138 MMHG | HEART RATE: 64 BPM | RESPIRATION RATE: 14 BRPM | DIASTOLIC BLOOD PRESSURE: 78 MMHG | WEIGHT: 143.8 LBS | HEIGHT: 65 IN | BODY MASS INDEX: 23.96 KG/M2

## 2022-11-08 DIAGNOSIS — I10 ESSENTIAL HYPERTENSION: ICD-10-CM

## 2022-11-08 DIAGNOSIS — M81.0 AGE-RELATED OSTEOPOROSIS WITHOUT CURRENT PATHOLOGICAL FRACTURE: ICD-10-CM

## 2022-11-08 DIAGNOSIS — Z00.00 MEDICARE ANNUAL WELLNESS VISIT, SUBSEQUENT: Primary | ICD-10-CM

## 2022-11-08 DIAGNOSIS — Z78.0 ASYMPTOMATIC MENOPAUSAL STATE: ICD-10-CM

## 2022-11-08 DIAGNOSIS — E78.00 PURE HYPERCHOLESTEROLEMIA: ICD-10-CM

## 2022-11-08 PROCEDURE — 99213 OFFICE O/P EST LOW 20 MIN: CPT | Performed by: INTERNAL MEDICINE

## 2022-11-08 PROCEDURE — G0439 PPPS, SUBSEQ VISIT: HCPCS | Performed by: INTERNAL MEDICINE

## 2022-11-08 PROCEDURE — 1123F ACP DISCUSS/DSCN MKR DOCD: CPT | Performed by: INTERNAL MEDICINE

## 2022-11-08 PROCEDURE — 3074F SYST BP LT 130 MM HG: CPT | Performed by: INTERNAL MEDICINE

## 2022-11-08 PROCEDURE — 3078F DIAST BP <80 MM HG: CPT | Performed by: INTERNAL MEDICINE

## 2022-11-08 RX ORDER — CITALOPRAM 10 MG/1
10 TABLET ORAL DAILY
COMMUNITY

## 2022-11-08 ASSESSMENT — PATIENT HEALTH QUESTIONNAIRE - PHQ9
2. FEELING DOWN, DEPRESSED OR HOPELESS: 0
7. TROUBLE CONCENTRATING ON THINGS, SUCH AS READING THE NEWSPAPER OR WATCHING TELEVISION: 0
1. LITTLE INTEREST OR PLEASURE IN DOING THINGS: 0
10. IF YOU CHECKED OFF ANY PROBLEMS, HOW DIFFICULT HAVE THESE PROBLEMS MADE IT FOR YOU TO DO YOUR WORK, TAKE CARE OF THINGS AT HOME, OR GET ALONG WITH OTHER PEOPLE: 0
SUM OF ALL RESPONSES TO PHQ QUESTIONS 1-9: 2
5. POOR APPETITE OR OVEREATING: 1
6. FEELING BAD ABOUT YOURSELF - OR THAT YOU ARE A FAILURE OR HAVE LET YOURSELF OR YOUR FAMILY DOWN: 0
SUM OF ALL RESPONSES TO PHQ QUESTIONS 1-9: 2
4. FEELING TIRED OR HAVING LITTLE ENERGY: 0
SUM OF ALL RESPONSES TO PHQ QUESTIONS 1-9: 2
3. TROUBLE FALLING OR STAYING ASLEEP: 1
SUM OF ALL RESPONSES TO PHQ9 QUESTIONS 1 & 2: 0
SUM OF ALL RESPONSES TO PHQ QUESTIONS 1-9: 2
8. MOVING OR SPEAKING SO SLOWLY THAT OTHER PEOPLE COULD HAVE NOTICED. OR THE OPPOSITE, BEING SO FIGETY OR RESTLESS THAT YOU HAVE BEEN MOVING AROUND A LOT MORE THAN USUAL: 0
9. THOUGHTS THAT YOU WOULD BE BETTER OFF DEAD, OR OF HURTING YOURSELF: 0

## 2022-11-08 ASSESSMENT — LIFESTYLE VARIABLES
HOW OFTEN DO YOU HAVE A DRINK CONTAINING ALCOHOL: NEVER
HOW MANY STANDARD DRINKS CONTAINING ALCOHOL DO YOU HAVE ON A TYPICAL DAY: PATIENT DOES NOT DRINK

## 2022-11-08 NOTE — ASSESSMENT & PLAN NOTE
Asymptomatic. Readings sub-optimal in the office today, but patient will continue to monitor at home and call if consistently > 140/90.

## 2022-11-08 NOTE — PROGRESS NOTES
Medicare Annual Wellness Visit    Jacob Young is here for Medicare AWV    Assessment & Plan   Medicare annual wellness visit, subsequent  Shingrix per pharmacy  Essential hypertension  Assessment & Plan:  Asymptomatic. Readings sub-optimal in the office today, but patient will continue to monitor at home and call if consistently > 140/90. Pure hypercholesterolemia  Assessment & Plan:  Given recent LDL of 160 and calcium score of 17, offered further risk stratification with lipoprotein A and apolipoprotein B levels. She will check insurance coverage for these tests. Orders:  -     Lipid, Fasting; Future  -     Apolipoprotein B; Future  -     LIPOPROTEIN A (LPA); Future  Age-related osteoporosis without current pathological fracture  Assessment & Plan: Tolerating yearly Reclast- continue for up to 5 years. Continue calcium and vitamin D supplements at current dose, and weight-bearing exercise. Asymptomatic menopausal state  -     DEXA Bone Density Axial Skeleton; Future    Recommendations for Preventive Services Due: see orders and patient instructions/AVS.  Recommended screening schedule for the next 5-10 years is provided to the patient in written form: see Patient Instructions/AVS.     Return in about 6 months (around 5/8/2023) for 30 MIN F/U. Subjective       Patient's complete Health Risk Assessment and screening values have been reviewed and are found in Flowsheets. The following problems were reviewed today and where indicated follow up appointments were made and/or referrals ordered. Positive Risk Factor Screenings with Interventions:    Fall Risk:  Do you feel unsteady or are you worried about falling? : no  2 or more falls in past year?: no  Fall with injury in past year?: (!) yes   Fall Risk Interventions:    Tripped over the hose- in PT for right shoulder injury. Making progress. Safety tips discussed.             General Health and ACP:  General  In general, how would you say your health is?: Good  In the past 7 days, have you experienced any of the following: New or Increased Pain, New or Increased Fatigue, Loneliness, Social Isolation, Stress or Anger?: No  Do you get the social and emotional support that you need?: Yes  Do you have a Living Will?: Yes    Advance Directives       Power of [address] Will ACP-Advance Directive ACP-Power of     Not on File Not on File Not on File Not on File        General Health Risk Interventions:  No Living Will: ACP documents already completed- patient asked to provide copy to the office    Health Habits/Nutrition:  Physical Activity: Insufficiently Active    Days of Exercise per Week: 3 days    Minutes of Exercise per Session: 30 min     Have you lost any weight without trying in the past 3 months?: (!) Yes  Body mass index: 24.3  Have you seen the dentist within the past year?: Yes  Health Habits/Nutrition Interventions:  Has lost about 2-3 pounds over the past year due to GI upset from recent prednisone use- improving overall. 77             Objective   Vitals:    11/08/22 0855 11/08/22 0938   BP: (!) 158/84 138/78   Pulse: 64    Resp: 14    SpO2: 98%    Weight: 143 lb 12.8 oz (65.2 kg)    Height: 5' 4.5\" (1.638 m)       Body mass index is 24.3 kg/m². Physical Exam  Constitutional:       General: She is not in acute distress. Appearance: She is well-developed. Eyes:      Conjunctiva/sclera: Conjunctivae normal.   Neck:      Thyroid: No thyroid mass or thyromegaly. Cardiovascular:      Rate and Rhythm: Normal rate and regular rhythm. Heart sounds: Normal heart sounds. No murmur heard. No friction rub. No gallop. Pulmonary:      Effort: Pulmonary effort is normal. No respiratory distress. Breath sounds: Normal breath sounds. No wheezing, rhonchi or rales. Musculoskeletal:      Right lower leg: No edema. Left lower leg: No edema. Lymphadenopathy:      Cervical: No cervical adenopathy.    Skin:     General: Skin is warm and dry. Findings: No erythema or rash. Neurological:      Mental Status: She is alert and oriented to person, place, and time. Psychiatric:         Speech: Speech normal.         Behavior: Behavior normal.         Thought Content: Thought content normal.         Judgment: Judgment normal.          Allergies   Allergen Reactions    Cephalexin Hives     Prior to Visit Medications    Medication Sig Taking? Authorizing Provider   citalopram (CELEXA) 10 MG tablet Take 10 mg by mouth daily Yes Historical Provider, MD   lansoprazole (PREVACID) 30 MG delayed release capsule TAKE ONE CAPSULE BY MOUTH DAILY Yes Sarthak Parsons MD   levothyroxine (SYNTHROID) 112 MCG tablet TAKE ONE TABLET BY MOUTH DAILY Yes Sarthak Parsons MD   candesartan (ATACAND) 16 MG tablet TAKE ONE TABLET BY MOUTH DAILY Yes Sarthak Parsons MD   acetaminophen (TYLENOL) 500 MG tablet Take 500 mg by mouth 2 times daily Yes Historical Provider, MD   ondansetron (ZOFRAN) 4 MG tablet TAKE ONE TABLET BY MOUTH DAILY AS NEEDED FOR VOMITING Yes Sarthak Parsons MD   clobetasol (TEMOVATE) 0.05 % cream APPLY TO AFFECTED AREA(S) ON THE LEGS TWO TIMES A DAY AS NEEDED FOR RECURRENCE OF DERMATITIS Yes Claudia Espinal MD   metoprolol succinate (TOPROL XL) 25 MG extended release tablet TAKE ONE TABLET BY MOUTH DAILY Yes Sarthak Parsons MD   fluticasone (FLONASE) 50 MCG/ACT nasal spray SPRAY ONE SPRAY IN EACH NOSTRIL ONCE DAILY Yes Arthur Jorge MD   latanoprost (XALATAN) 0.005 % ophthalmic solution Place 1 drop into the right eye nightly  Yes Historical Provider, MD   SUMAtriptan (IMITREX) 50 MG tablet TAKE ONE TABLET BY MOUTH AT ONSET OF HEADACHE; MAY REPEAT ONE TABLET IN 2 HOURS IF NEEDED.  Yes Sarthak Parsons MD   Calcium Citrate-Vitamin D (CALCIUM + D PO) Take by mouth daily  Yes Historical Provider, MD   polyethylene glycol (GLYCOLAX) 17 g packet Take 17 g by mouth every other day Yes Historical Provider, MD   [address] [place] [address] (ALIGN PO) Take  by mouth.  Yes Historical Provider, MD   fexofenadine (ALLEGRA) 60 MG tablet Take 1 tablet by mouth daily  Patient not taking: Reported on 11/8/2022  Irlanda Pearl MD   zoledronic acid (RECLAST) 5 MG/100ML SOLN Infuse 100 mLs intravenously once for 1 dose  Bella Jimenez MD       [facility] (Including outside providers/suppliers regularly involved in providing care):   Patient Care Team:  Bella Jimenez MD as PCP - General (Internal Medicine)  Jigna Navarro DO as PCP - Hematology/Oncology (Hematology and Oncology)  Bella Jimenez MD as PCP - Counts include [facility]     Reviewed and updated this visit:  Tobacco  Allergies  Meds  Problems  Med Hx  Surg Hx  Soc Hx  Fam Hx

## 2022-11-08 NOTE — PATIENT INSTRUCTIONS
Personalized Preventive Plan for Charmayne Cristal - 11/8/2022  Medicare offers a range of preventive health benefits. Some of the tests and screenings are paid in full while other may be subject to a deductible, co-insurance, and/or copay. Some of these benefits include a comprehensive review of your medical history including lifestyle, illnesses that may run in your family, and various assessments and screenings as appropriate. After reviewing your medical record and screening and assessments performed today your provider may have ordered immunizations, labs, imaging, and/or referrals for you. A list of these orders (if applicable) as well as your Preventive Care list are included within your After Visit Summary for your review. Other Preventive Recommendations:    A preventive eye exam performed by an eye specialist is recommended every 1-2 years to screen for glaucoma; cataracts, macular degeneration, and other eye disorders. A preventive dental visit is recommended every 6 months. Try to get at least 150 minutes of exercise per week or 10,000 steps per day on a pedometer . Order or download the FREE \"Exercise & Physical Activity: Your Everyday Guide\" from [employer]. Call 8-774.567.3480 or search [employer] online. You need 2684-1717 mg of calcium and 5128-6875 IU of vitamin D per day. It is possible to meet your calcium requirement with diet alone, but a vitamin D supplement is usually necessary to meet this goal.  When exposed to the sun, use a sunscreen that protects against both UVA and UVB radiation with an SPF of 30 or greater. Reapply every 2 to 3 hours or after sweating, drying off with a towel, or swimming. Always wear a seat belt when traveling in a car. Always wear a helmet when riding a bicycle or motorcycle.

## 2022-11-08 NOTE — ASSESSMENT & PLAN NOTE
Given recent LDL of 160 and calcium score of 17, offered further risk stratification with lipoprotein A and apolipoprotein B levels. She will check insurance coverage for these tests.

## 2022-11-08 NOTE — ASSESSMENT & PLAN NOTE
Tolerating yearly Reclast- continue for up to 5 years. Continue calcium and vitamin D supplements at current dose, and weight-bearing exercise.

## 2022-11-11 ENCOUNTER — TELEPHONE (OUTPATIENT)
Dept: INTERNAL MEDICINE CLINIC | Age: 72
End: 2022-11-11

## 2022-11-11 NOTE — TELEPHONE ENCOUNTER
----- Message from [address] [facility] sent at 11/11/2022 10:54 AM EST -----  Subject: Message to Provider    QUESTIONS  Information for Provider? Pt  Tray Yousif called stated they called   insurance to see if blood test where covered insurance stated that it   depends on why she's ordering them. Insurance told pt that  can look   at policy and determine if labs are covered. Policy #[id number] [employer]  ---------------------------------------------------------------------------  --------------  Kam Rodriguez [facility]  [phone]; OK to leave message on voicemail  ---------------------------------------------------------------------------  --------------  SCRIPT ANSWERS  Relationship to Patient? Other  Representative Name? Tray Yousif ()  Is the Representative on the appropriate HIPAA document in Epic?  Yes

## 2022-11-11 NOTE — TELEPHONE ENCOUNTER
We are unable to check her policy. I ordered the test with the diagnosis pure hypercholesterolemia- E 78.0. The  should be able to run this diagnosis code to determine if the tests are covered.

## 2022-11-17 NOTE — TELEPHONE ENCOUNTER
There is no other appropriate code- these are both tests to further evaluate her cardiovascular risk since she has high cholesterol. The code is the same for both- maybe they will only cover 1 of the tests?

## 2022-11-17 NOTE — TELEPHONE ENCOUNTER
PT was advised and stated her Insurance company wants a code for each tests that was odered and not the same codes. Diagnosis and code was given to patient . She stated that the code can not be the same for each tests.  Pt was advised that I will send a message to Dr. Josie Hylton

## 2022-11-19 NOTE — TELEPHONE ENCOUNTER
Patient has been advised and voiced her understanding. She will call the insurance company money to further discuss this with them.

## 2023-01-01 ENCOUNTER — PATIENT MESSAGE (OUTPATIENT)
Dept: INTERNAL MEDICINE CLINIC | Age: 73
End: 2023-01-01

## 2023-01-02 RX ORDER — CANDESARTAN 16 MG/1
TABLET ORAL
Qty: 90 TABLET | Refills: 1 | Status: SHIPPED | OUTPATIENT
Start: 2023-01-02

## 2023-01-02 RX ORDER — LANSOPRAZOLE 30 MG/1
CAPSULE, DELAYED RELEASE ORAL
Qty: 90 CAPSULE | Refills: 1 | Status: SHIPPED | OUTPATIENT
Start: 2023-01-02

## 2023-01-05 RX ORDER — METOPROLOL SUCCINATE 25 MG/1
TABLET, EXTENDED RELEASE ORAL
Qty: 90 TABLET | Refills: 1 | Status: SHIPPED | OUTPATIENT
Start: 2023-01-05

## 2023-01-05 RX ORDER — METOPROLOL SUCCINATE 25 MG/1
TABLET, EXTENDED RELEASE ORAL
Qty: 90 TABLET | Refills: 1 | Status: SHIPPED | OUTPATIENT
Start: 2023-01-05 | End: 2023-01-05 | Stop reason: SDUPTHER

## 2023-01-05 RX ORDER — LEVOTHYROXINE SODIUM 112 UG/1
TABLET ORAL
Qty: 90 TABLET | Refills: 1 | Status: SHIPPED | OUTPATIENT
Start: 2023-01-05

## 2023-01-05 RX ORDER — CITALOPRAM 10 MG/1
10 TABLET ORAL DAILY
Qty: 90 TABLET | Refills: 1 | Status: SHIPPED | OUTPATIENT
Start: 2023-01-05

## 2023-01-17 ENCOUNTER — TELEPHONE (OUTPATIENT)
Dept: INTERNAL MEDICINE CLINIC | Age: 73
End: 2023-01-17

## 2023-01-17 NOTE — TELEPHONE ENCOUNTER
May from [employer] is calling to let Dr. Roger Flores know that a pre determination may be needed for the non-standard lab tests that have been ordered (codes 79754) in order for insurance to cover these tests. She states medical notes and records may be necessary since the insurance company is unsure what these are being requested for. Patient's  was the one who reached out to [facility] to find out if these will be covered. May at [facility] states the department number for pre determinations is [phone]. Patient's  should be contacted with any additional questions.

## 2023-02-25 ENCOUNTER — HOSPITAL ENCOUNTER (OUTPATIENT)
Dept: WOMENS IMAGING | Age: 73
Discharge: HOME | End: 2023-02-25
Payer: MEDICARE

## 2023-02-25 VITALS — BODY MASS INDEX: 23.73 KG/M2 | WEIGHT: 139 LBS | HEIGHT: 64 IN

## 2023-02-25 DIAGNOSIS — Z12.31 BREAST CANCER SCREENING BY MAMMOGRAM: ICD-10-CM

## 2023-02-25 PROCEDURE — 77067 SCR MAMMO BI INCL CAD: CPT

## 2023-03-22 ENCOUNTER — PATIENT MESSAGE (OUTPATIENT)
Dept: DERMATOLOGY | Age: 73
End: 2023-03-22

## 2023-03-22 NOTE — TELEPHONE ENCOUNTER
From: Max Crane  To: Dr. Krause Ask  Sent: 3/22/2023 8:42 AM EDT  Subject: Refill needed at new pharmacy    Dr. Mabel Anton, Last summer you treated me for a reoccurring rash. One medication that really helped was a prescription of AllegraFexofendine HCL 60 Mc tablets. I only have used it for flare ups but Im starting to feel itchy again and am trying to stop this before it gets worse. I dont have a refill and we had to change pharmacies recently. I was hoping you would refill this for me.  Our new pharmacy is:  [address] [place] [address]. [facility] [address]  [phone]  Thanks for your help!!

## 2023-03-23 RX ORDER — FEXOFENADINE HYDROCHLORIDE 60 MG/1
60 TABLET, FILM COATED ORAL DAILY
Qty: 30 TABLET | Refills: 1 | Status: SHIPPED | OUTPATIENT
Start: 2023-03-23

## 2023-05-08 ENCOUNTER — TELEPHONE (OUTPATIENT)
Dept: INTERNAL MEDICINE CLINIC | Age: 73
End: 2023-05-08

## 2023-05-08 DIAGNOSIS — M81.0 AGE-RELATED OSTEOPOROSIS WITHOUT CURRENT PATHOLOGICAL FRACTURE: ICD-10-CM

## 2023-05-08 LAB
ANION GAP SERPL CALCULATED.3IONS-SCNC: 9 MMOL/L (ref 3–16)
BUN SERPL-MCNC: 11 MG/DL (ref 7–20)
CALCIUM SERPL-MCNC: 9.2 MG/DL (ref 8.3–10.6)
CHLORIDE SERPL-SCNC: 104 MMOL/L (ref 99–110)
CO2 SERPL-SCNC: 26 MMOL/L (ref 21–32)
CREAT SERPL-MCNC: <0.5 MG/DL (ref 0.6–1.2)
GFR SERPLBLD CREATININE-BSD FMLA CKD-EPI: >60 ML/MIN/{1.73_M2}
GLUCOSE SERPL-MCNC: 97 MG/DL (ref 70–99)
POTASSIUM SERPL-SCNC: 4.3 MMOL/L (ref 3.5–5.1)
SODIUM SERPL-SCNC: 139 MMOL/L (ref 136–145)

## 2023-05-08 NOTE — TELEPHONE ENCOUNTER
Maria De Jesus from [facility] is calling to stated that Dr. Vivien Arevalo put in an order for Infusion for Zobledronic Acid 5 MG and per the Infusion office Dr. Vivien Arevalo does not have privilege at this facility and wanted to know if another Doctor can sign off on this order that does have privilege. If not the patient will have to go to [facility] to have this done. Please call Maria De Jesus   [phone]       The order is scanned in Media.    Please advise

## 2023-05-09 NOTE — TELEPHONE ENCOUNTER
Spoke with Maria De Jesus. We did find where it shows Dr. Corie Patel has core privileges.  She will contact patient, to get them scheduled

## 2023-05-10 RX ORDER — ZOLEDRONIC ACID 5 MG/100ML
5 INJECTION, SOLUTION INTRAVENOUS ONCE
OUTPATIENT
Start: 2023-05-10 | End: 2023-05-10

## 2023-05-10 RX ORDER — SODIUM CHLORIDE 9 MG/ML
5-250 INJECTION, SOLUTION INTRAVENOUS PRN
OUTPATIENT
Start: 2023-05-10

## 2023-05-10 RX ORDER — SODIUM CHLORIDE 0.9 % (FLUSH) 0.9 %
5-40 SYRINGE (ML) INJECTION PRN
OUTPATIENT
Start: 2023-05-10

## 2023-05-16 ENCOUNTER — HOSPITAL ENCOUNTER (OUTPATIENT)
Dept: ONCOLOGY | Age: 73
Setting detail: INFUSION SERIES
Discharge: HOME | End: 2023-05-16
Payer: MEDICARE

## 2023-05-16 VITALS
TEMPERATURE: 97.4 F | SYSTOLIC BLOOD PRESSURE: 128 MMHG | DIASTOLIC BLOOD PRESSURE: 60 MMHG | HEART RATE: 56 BPM | RESPIRATION RATE: 16 BRPM

## 2023-05-16 DIAGNOSIS — M81.0 AGE-RELATED OSTEOPOROSIS WITHOUT CURRENT PATHOLOGICAL FRACTURE: Primary | ICD-10-CM

## 2023-05-16 PROCEDURE — 2580000003 HC RX 258: Performed by: INTERNAL MEDICINE

## 2023-05-16 PROCEDURE — 6360000002 HC RX W HCPCS: Performed by: INTERNAL MEDICINE

## 2023-05-16 PROCEDURE — 96365 THER/PROPH/DIAG IV INF INIT: CPT

## 2023-05-16 PROCEDURE — 99211 OFF/OP EST MAY X REQ PHY/QHP: CPT

## 2023-05-16 RX ORDER — SODIUM CHLORIDE 9 MG/ML
5-250 INJECTION, SOLUTION INTRAVENOUS PRN
Status: DISCONTINUED | OUTPATIENT
Start: 2023-05-16 | End: 2023-05-16

## 2023-05-16 RX ORDER — ZOLEDRONIC ACID 5 MG/100ML
5 INJECTION, SOLUTION INTRAVENOUS ONCE
Status: COMPLETED | OUTPATIENT
Start: 2023-05-16 | End: 2023-05-16

## 2023-05-16 RX ORDER — SODIUM CHLORIDE 0.9 % (FLUSH) 0.9 %
5-40 SYRINGE (ML) INJECTION PRN
Status: DISCONTINUED | OUTPATIENT
Start: 2023-05-16 | End: 2023-05-16

## 2023-05-16 RX ORDER — SODIUM CHLORIDE 0.9 % (FLUSH) 0.9 %
5-40 SYRINGE (ML) INJECTION PRN
Status: CANCELLED | OUTPATIENT
Start: 2023-05-16

## 2023-05-16 RX ORDER — ZOLEDRONIC ACID 5 MG/100ML
5 INJECTION, SOLUTION INTRAVENOUS ONCE
Status: CANCELLED | OUTPATIENT
Start: 2023-05-16 | End: 2023-05-16

## 2023-05-16 RX ORDER — SODIUM CHLORIDE 9 MG/ML
5-250 INJECTION, SOLUTION INTRAVENOUS PRN
Status: CANCELLED | OUTPATIENT
Start: 2023-05-16

## 2023-05-16 RX ADMIN — Medication 10 ML: at 08:15

## 2023-05-16 RX ADMIN — ZOLEDRONIC ACID 5 MG: 0.05 INJECTION, SOLUTION INTRAVENOUS at 08:17

## 2023-05-16 RX ADMIN — SODIUM CHLORIDE 100 ML/HR: 9 INJECTION, SOLUTION INTRAVENOUS at 08:16

## 2023-05-16 NOTE — PROGRESS NOTES
Pt ambulatory to [facility]fusion [facility] for IV Reclast infusion Pt denies complaints. IV access obtained. Infusion administered. VSS upon completion. Pt discharged home.

## 2023-05-17 ENCOUNTER — TELEPHONE (OUTPATIENT)
Dept: DERMATOLOGY | Age: 73
End: 2023-05-17

## 2023-05-17 NOTE — TELEPHONE ENCOUNTER
Pt noticed a purple spot under the skin by her lip. It is new. She just noticed it about 2 weeks ago. She is a little concerned because it is not going away. She would like worked in before October if possible.     Phone [phone]

## 2023-05-18 PROBLEM — F32.5 MAJOR DEPRESSIVE DISORDER WITH SINGLE EPISODE, IN FULL REMISSION (HCC): Status: RESOLVED | Noted: 2023-05-18 | Resolved: 2023-05-18

## 2023-05-18 PROBLEM — F32.5 MAJOR DEPRESSIVE DISORDER WITH SINGLE EPISODE, IN FULL REMISSION (HCC): Status: ACTIVE | Noted: 2023-05-18

## 2023-05-20 SDOH — ECONOMIC STABILITY: FOOD INSECURITY: WITHIN THE PAST 12 MONTHS, YOU WORRIED THAT YOUR FOOD WOULD RUN OUT BEFORE YOU GOT MONEY TO BUY MORE.: NEVER TRUE

## 2023-05-20 SDOH — ECONOMIC STABILITY: INCOME INSECURITY: HOW HARD IS IT FOR YOU TO PAY FOR THE VERY BASICS LIKE FOOD, HOUSING, MEDICAL CARE, AND HEATING?: NOT HARD AT ALL

## 2023-05-20 SDOH — ECONOMIC STABILITY: FOOD INSECURITY: WITHIN THE PAST 12 MONTHS, THE FOOD YOU BOUGHT JUST DIDN'T LAST AND YOU DIDN'T HAVE MONEY TO GET MORE.: NEVER TRUE

## 2023-05-23 ENCOUNTER — OFFICE VISIT (OUTPATIENT)
Dept: INTERNAL MEDICINE CLINIC | Age: 73
End: 2023-05-23

## 2023-05-23 VITALS
HEART RATE: 57 BPM | WEIGHT: 145 LBS | SYSTOLIC BLOOD PRESSURE: 138 MMHG | BODY MASS INDEX: 24.89 KG/M2 | OXYGEN SATURATION: 96 % | DIASTOLIC BLOOD PRESSURE: 66 MMHG

## 2023-05-23 DIAGNOSIS — E78.00 PURE HYPERCHOLESTEROLEMIA: ICD-10-CM

## 2023-05-23 DIAGNOSIS — M81.0 AGE-RELATED OSTEOPOROSIS WITHOUT CURRENT PATHOLOGICAL FRACTURE: ICD-10-CM

## 2023-05-23 DIAGNOSIS — K44.9 HIATAL HERNIA: Chronic | ICD-10-CM

## 2023-05-23 DIAGNOSIS — I10 ESSENTIAL HYPERTENSION: Primary | ICD-10-CM

## 2023-05-23 DIAGNOSIS — E03.4 HYPOTHYROIDISM DUE TO ACQUIRED ATROPHY OF THYROID: ICD-10-CM

## 2023-05-23 DIAGNOSIS — F32.4 MAJOR DEPRESSIVE DISORDER WITH SINGLE EPISODE, IN PARTIAL REMISSION (HCC): ICD-10-CM

## 2023-05-23 ASSESSMENT — PATIENT HEALTH QUESTIONNAIRE - PHQ9
SUM OF ALL RESPONSES TO PHQ QUESTIONS 1-9: 1
10. IF YOU CHECKED OFF ANY PROBLEMS, HOW DIFFICULT HAVE THESE PROBLEMS MADE IT FOR YOU TO DO YOUR WORK, TAKE CARE OF THINGS AT HOME, OR GET ALONG WITH OTHER PEOPLE: 0
2. FEELING DOWN, DEPRESSED OR HOPELESS: 0
5. POOR APPETITE OR OVEREATING: 0
7. TROUBLE CONCENTRATING ON THINGS, SUCH AS READING THE NEWSPAPER OR WATCHING TELEVISION: 0
SUM OF ALL RESPONSES TO PHQ QUESTIONS 1-9: 1
SUM OF ALL RESPONSES TO PHQ9 QUESTIONS 1 & 2: 0
8. MOVING OR SPEAKING SO SLOWLY THAT OTHER PEOPLE COULD HAVE NOTICED. OR THE OPPOSITE, BEING SO FIGETY OR RESTLESS THAT YOU HAVE BEEN MOVING AROUND A LOT MORE THAN USUAL: 0
SUM OF ALL RESPONSES TO PHQ QUESTIONS 1-9: 1
4. FEELING TIRED OR HAVING LITTLE ENERGY: 0
1. LITTLE INTEREST OR PLEASURE IN DOING THINGS: 0
3. TROUBLE FALLING OR STAYING ASLEEP: 1
9. THOUGHTS THAT YOU WOULD BE BETTER OFF DEAD, OR OF HURTING YOURSELF: 0
SUM OF ALL RESPONSES TO PHQ QUESTIONS 1-9: 1
6. FEELING BAD ABOUT YOURSELF - OR THAT YOU ARE A FAILURE OR HAVE LET YOURSELF OR YOUR FAMILY DOWN: 0

## 2023-05-23 NOTE — ASSESSMENT & PLAN NOTE
Given recent LDL of 160 and calcium score of 17, offered further risk stratification with lipoprotein A and apolipoprotein B levels- she will have these labs drawn if covered by her insurance or out of pocket cost reasonable.

## 2023-05-23 NOTE — ASSESSMENT & PLAN NOTE
Worse with increased external stressors. Did not tolerate higher dose of Celexa, so will try switching to Zoloft 25-50 mg qd. Patient will call for any significant medication side effects or worsening symptoms of depression.

## 2023-05-23 NOTE — ASSESSMENT & PLAN NOTE
Worse recently secondary to increased constipation related to antihistamine use for allergies. She will try increasing Miralax to qd, then try to decrease Prevacid dose back to 30 mg qd.

## 2023-05-23 NOTE — ASSESSMENT & PLAN NOTE
Tolerating yearly Reclast except for transient arthralgias- continue for up to 5 years. Continue calcium and vitamin D supplements at current dose, and weight-bearing exercise. Dexa due- she was reminded to schedule.

## 2023-05-24 LAB
CHOLEST SERPL-MCNC: 253 MG/DL (ref 0–199)
HDLC SERPL-MCNC: 70 MG/DL (ref 40–60)
LDL CHOLESTEROL CALCULATED: 162 MG/DL
TRIGL SERPL-MCNC: 105 MG/DL (ref 0–150)
TSH SERPL DL<=0.005 MIU/L-ACNC: 3.04 UIU/ML (ref 0.27–4.2)
VLDLC SERPL CALC-MCNC: 21 MG/DL

## 2023-05-25 LAB — LPA SERPL-MCNC: <6 MG/DL

## 2023-05-29 RX ORDER — LANSOPRAZOLE 30 MG/1
CAPSULE, DELAYED RELEASE ORAL
Qty: 90 CAPSULE | Refills: 1 | Status: SHIPPED | OUTPATIENT
Start: 2023-05-29

## 2023-05-29 RX ORDER — CANDESARTAN 16 MG/1
TABLET ORAL
Qty: 90 TABLET | Refills: 1 | Status: SHIPPED | OUTPATIENT
Start: 2023-05-29

## 2023-05-30 RX ORDER — CITALOPRAM 10 MG/1
TABLET ORAL
Qty: 90 TABLET | Refills: 3 | OUTPATIENT
Start: 2023-05-30

## 2023-05-30 RX ORDER — LEVOTHYROXINE SODIUM 112 UG/1
TABLET ORAL
Qty: 90 TABLET | Refills: 1 | Status: SHIPPED | OUTPATIENT
Start: 2023-05-30

## 2023-05-30 RX ORDER — METOPROLOL SUCCINATE 25 MG/1
TABLET, EXTENDED RELEASE ORAL
Qty: 90 TABLET | Refills: 1 | Status: SHIPPED | OUTPATIENT
Start: 2023-05-30

## 2023-06-08 ENCOUNTER — OFFICE VISIT (OUTPATIENT)
Dept: DERMATOLOGY | Age: 73
End: 2023-06-08
Payer: MEDICARE

## 2023-06-08 DIAGNOSIS — L82.1 SK (SEBORRHEIC KERATOSIS): ICD-10-CM

## 2023-06-08 DIAGNOSIS — D18.01 ANGIOMA OF SKIN: Primary | ICD-10-CM

## 2023-06-08 PROCEDURE — 1123F ACP DISCUSS/DSCN MKR DOCD: CPT | Performed by: DERMATOLOGY

## 2023-06-08 PROCEDURE — 99212 OFFICE O/P EST SF 10 MIN: CPT | Performed by: DERMATOLOGY

## 2023-06-08 NOTE — PROGRESS NOTES
30 tablet 2    clobetasol (TEMOVATE) 0.05 % cream APPLY TO AFFECTED AREA(S) ON THE LEGS TWO TIMES A DAY AS NEEDED FOR RECURRENCE OF DERMATITIS 60 g 1    fluticasone (FLONASE) 50 MCG/ACT nasal spray SPRAY ONE SPRAY IN EACH NOSTRIL ONCE DAILY 1 each 11    latanoprost (XALATAN) 0.005 % ophthalmic solution Place 1 drop into the right eye nightly      SUMAtriptan (IMITREX) 50 MG tablet TAKE ONE TABLET BY MOUTH AT ONSET OF HEADACHE; MAY REPEAT ONE TABLET IN 2 HOURS IF NEEDED. 12 tablet 2    Calcium Citrate-Vitamin D (CALCIUM + D PO) Take by mouth daily       polyethylene glycol (GLYCOLAX) 17 g packet Take 17 g by mouth every other day      Misc Intestinal Lou Regulat (ALIGN PO) Take  by mouth. Physical Examination       Well appearing. 1.  Left upper lip at the junction of the wet and dry mucosa is a 1 to 2 mm round smooth violaceous papule. 2.  Right upper forehead with a stuck on appearing verrucous pink-brown papule. Assessment and Plan     1. Angioma of the left upper lip -     Reassurance. 2. SK (seborrheic keratosis)     Reassurance.           --Sharon Landry MD

## 2023-09-02 ENCOUNTER — TELEPHONE (OUTPATIENT)
Dept: INTERNAL MEDICINE CLINIC | Age: 73
End: 2023-09-02

## 2023-09-02 DIAGNOSIS — U07.1 COVID-19: Primary | ICD-10-CM

## 2023-09-02 DIAGNOSIS — R05.1 ACUTE COUGH: ICD-10-CM

## 2023-09-02 RX ORDER — BENZONATATE 100 MG/1
100-200 CAPSULE ORAL 3 TIMES DAILY PRN
Qty: 60 CAPSULE | Refills: 0 | Status: SHIPPED | OUTPATIENT
Start: 2023-09-02 | End: 2023-09-09

## 2023-09-02 NOTE — TELEPHONE ENCOUNTER
Documentation:  I communicated with the patient and/or health care decision maker about COVID-19. Details of this discussion including any medical advice provided:     Sxs started Thursday afternoon - sore throat, nasal congestion, runny nose, no fevers. Mild cough when lays down. Tested positive for COVID-19 last night on rapid test at home.  ill and just came home from hospital. Interested in paxlovid. Assess/plan - COVID-19 with risk factors for severe disease (age, HTN). After discussion of potential risks/benefits/side effects of treatment she would like to proceed with antiviral treatment (discussed metallic taste, diarrhea, muscle aches, risk of rebound COVID). Normal renal function. No major medication interactions. Rx paxlovid x 5 days. Discussed CDC isolation guidelines and alternative strategies for return to normal.     Total Time: minutes: 11-20 minutes    Eliu Salazar was evaluated through a synchronous (real-time) audio encounter. Patient identification was verified at the start of the visit. She (or guardian if applicable) is aware that this is a billable service, which includes applicable co-pays. This visit was conducted with the patient's (and/or legal guardian's) verbal consent. She has not had a related appointment within my department in the past 7 days or scheduled within the next 24 hours. The patient was located at Home: [address] [place] [address]. The provider was located at Home ([facility]. Note: not billable if this call serves to triage the patient into an appointment for the relevant concern    Eliu Salazar is a 75 y.o. female evaluated via telephone on 9/2/2023 for No chief complaint on file.   Violetta Smith MD

## 2023-09-13 RX ORDER — ONDANSETRON 4 MG/1
4 TABLET, FILM COATED ORAL EVERY 8 HOURS PRN
Qty: 30 TABLET | Refills: 2 | Status: SHIPPED | OUTPATIENT
Start: 2023-09-13

## 2023-09-20 NOTE — PROGRESS NOTES
m).    Weight as of 5/23/23: 145 lb (65.8 kg). HPI  Patient tested positive for COVID-19 on 9/1-  symptoms started 8/31, including sore throat, nasal congestion, clear rhinorrhea, dry cough. Treated with Paxlovid, which was completed 9/7- symptoms recurred 7/11- worse than original presentation. Current symptoms: left-sided dull headache, cough productive of thick, yellow sputum, ST, clear rhinorrhea. No fevers. Current treatment: Mucinex, Tessalon. ROS  As documented above    Physical Exam  Constitutional:       General: She is not in acute distress. Appearance: She is well-developed. HENT:      Head: Normocephalic and atraumatic. Mouth/Throat:      Lips: No lesions. Neck:      Comments: No visible mass  Pulmonary:      Effort: Pulmonary effort is normal. No respiratory distress. Skin:     Comments: No rash, erythema or other discoloration on facial skin and exposed areas of neck and upper extremities    Neurological:      Mental Status: She is alert. Comments: No facial asymmetry (cranial nerve 7 motor function) or gaze palsy   Psychiatric:         Attention and Perception: Attention normal.         Mood and Affect: Mood normal.         Speech: Speech normal.         Behavior: Behavior normal.         Thought Content: Thought content normal.         Cognition and Memory: Cognition normal.           --Devonte Delgado MD on 9/21/2023 at 9:15 AM    An electronic signature was used to authenticate this note.

## 2023-09-21 ENCOUNTER — TELEMEDICINE (OUTPATIENT)
Dept: INTERNAL MEDICINE CLINIC | Age: 73
End: 2023-09-21

## 2023-09-21 DIAGNOSIS — J40 BRONCHITIS: ICD-10-CM

## 2023-09-21 DIAGNOSIS — U07.1 COVID-19 VIRUS INFECTION: Primary | ICD-10-CM

## 2023-09-21 RX ORDER — AMOXICILLIN 875 MG/1
875 TABLET, COATED ORAL 2 TIMES DAILY
Qty: 20 TABLET | Refills: 0 | Status: SHIPPED | OUTPATIENT
Start: 2023-09-21 | End: 2023-10-01

## 2023-10-02 RX ORDER — CLOBETASOL PROPIONATE 0.5 MG/G
CREAM TOPICAL
Qty: 60 G | Refills: 1 | Status: SHIPPED | OUTPATIENT
Start: 2023-10-02

## 2023-10-09 RX ORDER — FEXOFENADINE HCL 60 MG/1
60 TABLET, FILM COATED ORAL DAILY
Qty: 30 TABLET | Refills: 1 | Status: SHIPPED | OUTPATIENT
Start: 2023-10-09

## 2023-10-09 RX ORDER — SERTRALINE HYDROCHLORIDE 25 MG/1
25 TABLET, FILM COATED ORAL DAILY
Qty: 90 TABLET | Refills: 1 | Status: SHIPPED | OUTPATIENT
Start: 2023-10-09

## 2023-10-09 NOTE — TELEPHONE ENCOUNTER
Patient requesting refill of the 25mg tablets instead of the 50mg. I have changed the dosage pending.      Thank you

## 2023-11-02 ENCOUNTER — HOSPITAL ENCOUNTER (OUTPATIENT)
Dept: GENERAL RADIOLOGY | Age: 73
Discharge: HOME | End: 2023-11-02
Attending: INTERNAL MEDICINE
Payer: MEDICARE

## 2023-11-02 DIAGNOSIS — Z78.0 ASYMPTOMATIC MENOPAUSAL STATE: ICD-10-CM

## 2023-11-02 PROCEDURE — 77080 DXA BONE DENSITY AXIAL: CPT

## 2023-11-20 NOTE — PROGRESS NOTES
Medicare Annual Wellness Visit    aDgo Santana is here for Medicare AWV    Assessment & Plan   Medicare annual wellness visit, subsequent  COVID vaccine and second Shingrix per pharmacy. Essential hypertension  Assessment & Plan:  Well-controlled. Continue current antihypertensive regimen. Orders:  -     Comprehensive Metabolic Panel, Fasting; Future  Pure hypercholesterolemia  Assessment & Plan:  Given calcium score of 17 and low risk lipoprotein A level, will continue to manage with lifestyle change since has been intolerant to statins in the past.  Orders:  -     Lipid, Fasting; Future  Hypothyroidism due to acquired atrophy of thyroid  Assessment & Plan:  Clinically euthyroid, but will adjust levothyroxine dose if indicated by lab results. Orders:  -     TSH; Future  Age-related osteoporosis without current pathological fracture  Assessment & Plan:  Worse at hip and spine per recent dexa despite 3 years of Reclast, possibly due to decreased weight-bearing exercise, which she was restarted. Higher dose of Prevacid over the past 8 months may have also been playing a role- now back to qd dosing. Will continue yearly Reclast for 2 more years, then recheck dexa- if still losing ground, will switch to Prolia. Continue calcium and vitamin D supplements. Hiatal hernia  Assessment & Plan:  Stable with dietary changes on qd Prevacid- continue. Lymphocytopenia  Assessment & Plan:  Asymptomatic. Will continue to monitor and refer back to hematology if counts drop significantly. Orders:  -     CBC with Auto Differential; Future  Major depressive disorder with single episode, in partial remission ([address])  Assessment & Plan:  Having significant residual symptoms on 25 mg dose of Zoloft, but does not tolerate higher dose of this medication or Celexa, so will try switching to Prozac 10 mg qd- may increase to 20 mg after 2 weeks if no significant side effects at the lower dose.       Recommendations for Preventive

## 2023-11-27 RX ORDER — LEVOTHYROXINE SODIUM 112 UG/1
TABLET ORAL
Qty: 90 TABLET | Refills: 1 | Status: SHIPPED | OUTPATIENT
Start: 2023-11-27

## 2023-11-27 RX ORDER — CANDESARTAN 16 MG/1
TABLET ORAL
Qty: 90 TABLET | Refills: 1 | Status: SHIPPED | OUTPATIENT
Start: 2023-11-27

## 2023-11-27 RX ORDER — LANSOPRAZOLE 30 MG/1
CAPSULE, DELAYED RELEASE ORAL
Qty: 90 CAPSULE | Refills: 1 | Status: SHIPPED | OUTPATIENT
Start: 2023-11-27

## 2023-11-27 RX ORDER — METOPROLOL SUCCINATE 25 MG/1
TABLET, EXTENDED RELEASE ORAL
Qty: 90 TABLET | Refills: 1 | Status: SHIPPED | OUTPATIENT
Start: 2023-11-27

## 2023-11-28 ENCOUNTER — OFFICE VISIT (OUTPATIENT)
Dept: INTERNAL MEDICINE CLINIC | Age: 73
End: 2023-11-28
Payer: MEDICARE

## 2023-11-28 VITALS
WEIGHT: 148 LBS | BODY MASS INDEX: 24.66 KG/M2 | HEART RATE: 61 BPM | HEIGHT: 65 IN | OXYGEN SATURATION: 99 % | SYSTOLIC BLOOD PRESSURE: 136 MMHG | DIASTOLIC BLOOD PRESSURE: 78 MMHG

## 2023-11-28 DIAGNOSIS — Z00.00 MEDICARE ANNUAL WELLNESS VISIT, SUBSEQUENT: Primary | ICD-10-CM

## 2023-11-28 DIAGNOSIS — F32.4 MAJOR DEPRESSIVE DISORDER WITH SINGLE EPISODE, IN PARTIAL REMISSION (HCC): ICD-10-CM

## 2023-11-28 DIAGNOSIS — E78.00 PURE HYPERCHOLESTEROLEMIA: ICD-10-CM

## 2023-11-28 DIAGNOSIS — K44.9 HIATAL HERNIA: Chronic | ICD-10-CM

## 2023-11-28 DIAGNOSIS — M81.0 AGE-RELATED OSTEOPOROSIS WITHOUT CURRENT PATHOLOGICAL FRACTURE: ICD-10-CM

## 2023-11-28 DIAGNOSIS — E03.4 HYPOTHYROIDISM DUE TO ACQUIRED ATROPHY OF THYROID: ICD-10-CM

## 2023-11-28 DIAGNOSIS — D72.810 LYMPHOCYTOPENIA: ICD-10-CM

## 2023-11-28 DIAGNOSIS — I10 ESSENTIAL HYPERTENSION: ICD-10-CM

## 2023-11-28 PROCEDURE — 3075F SYST BP GE 130 - 139MM HG: CPT | Performed by: INTERNAL MEDICINE

## 2023-11-28 PROCEDURE — G0439 PPPS, SUBSEQ VISIT: HCPCS | Performed by: INTERNAL MEDICINE

## 2023-11-28 PROCEDURE — 99214 OFFICE O/P EST MOD 30 MIN: CPT | Performed by: INTERNAL MEDICINE

## 2023-11-28 PROCEDURE — 1123F ACP DISCUSS/DSCN MKR DOCD: CPT | Performed by: INTERNAL MEDICINE

## 2023-11-28 PROCEDURE — 3078F DIAST BP <80 MM HG: CPT | Performed by: INTERNAL MEDICINE

## 2023-11-28 RX ORDER — FLUOXETINE 10 MG/1
10 CAPSULE ORAL DAILY
Qty: 30 CAPSULE | Refills: 2 | Status: SHIPPED | OUTPATIENT
Start: 2023-11-28

## 2023-11-28 ASSESSMENT — COLUMBIA-SUICIDE SEVERITY RATING SCALE - C-SSRS
7. DID THIS OCCUR IN THE LAST THREE MONTHS: NO
3. HAVE YOU BEEN THINKING ABOUT HOW YOU MIGHT KILL YOURSELF?: NO
5. HAVE YOU STARTED TO WORK OUT OR WORKED OUT THE DETAILS OF HOW TO KILL YOURSELF? DO YOU INTEND TO CARRY OUT THIS PLAN?: NO
4. HAVE YOU HAD THESE THOUGHTS AND HAD SOME INTENTION OF ACTING ON THEM?: NO

## 2023-11-28 ASSESSMENT — PATIENT HEALTH QUESTIONNAIRE - PHQ9
SUM OF ALL RESPONSES TO PHQ QUESTIONS 1-9: 1
5. POOR APPETITE OR OVEREATING: 0
4. FEELING TIRED OR HAVING LITTLE ENERGY: 0
SUM OF ALL RESPONSES TO PHQ9 QUESTIONS 1 & 2: 1
2. FEELING DOWN, DEPRESSED OR HOPELESS: 0
3. TROUBLE FALLING OR STAYING ASLEEP: 0
7. TROUBLE CONCENTRATING ON THINGS, SUCH AS READING THE NEWSPAPER OR WATCHING TELEVISION: 0
8. MOVING OR SPEAKING SO SLOWLY THAT OTHER PEOPLE COULD HAVE NOTICED. OR THE OPPOSITE, BEING SO FIGETY OR RESTLESS THAT YOU HAVE BEEN MOVING AROUND A LOT MORE THAN USUAL: 0
10. IF YOU CHECKED OFF ANY PROBLEMS, HOW DIFFICULT HAVE THESE PROBLEMS MADE IT FOR YOU TO DO YOUR WORK, TAKE CARE OF THINGS AT HOME, OR GET ALONG WITH OTHER PEOPLE: 0
9. THOUGHTS THAT YOU WOULD BE BETTER OFF DEAD, OR OF HURTING YOURSELF: 0
SUM OF ALL RESPONSES TO PHQ QUESTIONS 1-9: 1
1. LITTLE INTEREST OR PLEASURE IN DOING THINGS: 1
6. FEELING BAD ABOUT YOURSELF - OR THAT YOU ARE A FAILURE OR HAVE LET YOURSELF OR YOUR FAMILY DOWN: 0
SUM OF ALL RESPONSES TO PHQ QUESTIONS 1-9: 1
SUM OF ALL RESPONSES TO PHQ QUESTIONS 1-9: 1

## 2023-11-28 NOTE — ASSESSMENT & PLAN NOTE
Having significant residual symptoms on 25 mg dose of Zoloft, but does not tolerate higher dose of this medication or Celexa, so will try switching to Prozac 10 mg qd- may increase to 20 mg after 2 weeks if no significant side effects at the lower dose.

## 2023-11-28 NOTE — ASSESSMENT & PLAN NOTE
Given calcium score of 17 and low risk lipoprotein A level, will continue to manage with lifestyle change since has been intolerant to statins in the past.

## 2023-11-28 NOTE — ASSESSMENT & PLAN NOTE
Worse at hip and spine per recent dexa despite 3 years of Reclast, possibly due to decreased weight-bearing exercise, which she was restarted. Higher dose of Prevacid over the past 8 months may have also been playing a role- now back to qd dosing. Will continue yearly Reclast for 2 more years, then recheck dexa- if still losing ground, will switch to Prolia. Continue calcium and vitamin D supplements.

## 2023-12-30 ENCOUNTER — OFFICE VISIT (OUTPATIENT)
Age: 73
End: 2023-12-30

## 2023-12-30 VITALS
OXYGEN SATURATION: 97 % | TEMPERATURE: 98.1 F | BODY MASS INDEX: 24.84 KG/M2 | SYSTOLIC BLOOD PRESSURE: 149 MMHG | HEART RATE: 60 BPM | WEIGHT: 147 LBS | DIASTOLIC BLOOD PRESSURE: 69 MMHG

## 2023-12-30 DIAGNOSIS — I10 ELEVATED SYSTOLIC BLOOD PRESSURE READING WITH DIAGNOSIS OF HYPERTENSION: ICD-10-CM

## 2023-12-30 DIAGNOSIS — J02.9 SORE THROAT: Primary | ICD-10-CM

## 2023-12-30 LAB — STREPTOCOCCUS A RNA: NEGATIVE

## 2023-12-30 NOTE — PATIENT INSTRUCTIONS
Keep hydrated, tylenol or ibuprofen (if no contraindications) as needed if pain or fever. Gargle-lozenges-cool liquids .   follow up in  3-5- days if not better  Return sooner or go see PCPif symptoms worse/feeling worse or has new symptoms or concerns    Advised recheck BP in 2 weeks

## 2024-01-30 RX ORDER — FLUOXETINE 10 MG/1
10 CAPSULE ORAL DAILY
Qty: 30 CAPSULE | Refills: 5 | Status: SHIPPED | OUTPATIENT
Start: 2024-01-30

## 2024-02-07 ENCOUNTER — PATIENT MESSAGE (OUTPATIENT)
Dept: DERMATOLOGY | Age: 74
End: 2024-02-07

## 2024-02-07 RX ORDER — CLOBETASOL PROPIONATE 0.5 MG/G
CREAM TOPICAL
Qty: 60 G | Refills: 1 | Status: SHIPPED | OUTPATIENT
Start: 2024-02-07

## 2024-02-07 NOTE — TELEPHONE ENCOUNTER
From: Kathy Crane  To: Dr. Sacha Aguilera  Sent: 2/7/2024 6:12 AM EST  Subject: Skin rash … Dermatitis     Dr. Aguilera, I’m continuing to have a skin rash that moves from one place to another on my body. Sometimes it goes away but this time it’s been bothering me since this summer. Lots of itching even when the rash subsides. I’ve had it mainly on my back, lower legs, side of my face and my arms. It migrates…if I get rid of it in one area, it appears in another area. Sometimes my skin tingles a bit with the itching. I’ve been taking Allegra 60 mg. In the morning and since last month I’ve been taking Children’s Benedryl in the evening which has helped a little. Not sure if I should be mixing these drugs but just feeling like nothing works much. I also use OTC cortisone creams and Clobetasol cream when the rash really acts up. I have stomach issues so I’ve had to up my Prevacid intake so I can take the allergy meds which is affecting my bones. Is there anything else I can do? I also use Aveeno Baby Eczema cream all over my body everyday and use hypoallergenic laundry detergent and softener   sheets. Any suggestions are welcome. In the past the itching and rash would go away and not reappear for months. Now the rash will subside but reappear and my skin itches most of the time. Thanks for reading this. Kathy RAMOS

## 2024-02-09 ENCOUNTER — OFFICE VISIT (OUTPATIENT)
Dept: DERMATOLOGY | Age: 74
End: 2024-02-09
Payer: MEDICARE

## 2024-02-09 DIAGNOSIS — L50.8 CHRONIC URTICARIA: Primary | ICD-10-CM

## 2024-02-09 PROCEDURE — 1123F ACP DISCUSS/DSCN MKR DOCD: CPT | Performed by: DERMATOLOGY

## 2024-02-09 PROCEDURE — 99213 OFFICE O/P EST LOW 20 MIN: CPT | Performed by: DERMATOLOGY

## 2024-02-09 NOTE — PROGRESS NOTES
[facility] Dermatology  Sacha Aguilera MD  [phone]      Kathy Crane  1950    73 y.o. female     Date of Visit: 2/9/2024    Chief Complaint: hives    History of Present Illness:    She returns to follow-up for chronic urticaria.  She reports some improvement with fexofenadine 60 mg daily and Benadryl every evening.  She continues to experience daily flares over the last several months.  She complains of some left-sided flushing of the face.  She denies any fevers, chills, flushing or diarrhea.    Stress prominent -  dealing with hear failure.        Review of Systems:  Gen: Feels well, good sense of health.  + weight gain.  No fevers or night sweats.   GI: + for reflux, no diarrhea      Past Medical History, Family History, Surgical History, Medications and Allergies reviewed.    Past Medical History:   Diagnosis Date    Allergic rhinitis     Anxiety     Depression     Hiatal hernia     Hyperlipidemia     Hypertension     Hypothyroidism     Migraine headache     Osteoporosis     Overactive bladder     Rupture of tendon of foot region     Right    Spondyloarthropathy     L5-S1    Tear of meniscus of left knee 9/5/2014     Past Surgical History:   Procedure Laterality Date    ANKLE SURGERY  1/17/11    Right gastroc lenghtening, proximal tibial bone graft triple arthrodesis of ankle    DILATION AND CURETTAGE OF UTERUS      x2    KNEE ARTHROSCOPY Left 1/28/15    TEAR DUCT SURGERY  2002       Allergies   Allergen Reactions    Cephalexin Hives     Outpatient Medications Marked as Taking for the 2/9/24 encounter (Office Visit) with Sacha Aguilera MD   Medication Sig Dispense Refill    clobetasol (TEMOVATE) 0.05 % cream APPLY TO AFFECTED AREA(S) ON THE LEGS TWO TIMES A DAY AS NEEDED FOR RECURRENCE OF DERMATITIS 60 g 1    FLUoxetine (PROZAC) 10 MG capsule TAKE 1 CAPSULE BY MOUTH DAILY 30 capsule 5    metoprolol succinate (TOPROL XL) 25 MG extended release tablet TAKE 1 TABLET DAILY 90

## 2024-03-06 ENCOUNTER — PATIENT MESSAGE (OUTPATIENT)
Dept: INTERNAL MEDICINE CLINIC | Age: 74
End: 2024-03-06

## 2024-03-06 NOTE — TELEPHONE ENCOUNTER
From: Kathy Crane  To: Dr. Judy Mcdaniel  Sent: 3/6/2024 7:24 AM EST  Subject: Reclast Infusion    Dr. Mcdaniel, My last Reclast infusion was on April 16, 2023 so I want to set up my next on a few days after that. I k ow you need to send in an order to [facility] where I go and also I need a script for a blood test in My Chart. I’ve forgotten how close to the infusion date the blood test must be. As soon as I know you’ve sent in the referral for the infusion and script for the blood test, I’ll set up the date. Thank you and best of luck on your new practice…I’m glad I can be part of it. Kathy RAMOS

## 2024-03-11 ENCOUNTER — OFFICE VISIT (OUTPATIENT)
Dept: DERMATOLOGY | Age: 74
End: 2024-03-11
Payer: MEDICARE

## 2024-03-11 DIAGNOSIS — L50.8 CHRONIC URTICARIA: Primary | ICD-10-CM

## 2024-03-11 PROCEDURE — 99213 OFFICE O/P EST LOW 20 MIN: CPT | Performed by: DERMATOLOGY

## 2024-03-11 PROCEDURE — 1123F ACP DISCUSS/DSCN MKR DOCD: CPT | Performed by: DERMATOLOGY

## 2024-03-11 NOTE — PROGRESS NOTES
TABLET DAILY 90 tablet 1    levothyroxine (SYNTHROID) 112 MCG tablet TAKE 1 TABLET DAILY 90 tablet 1    lansoprazole (PREVACID) 30 MG delayed release capsule TAKE 1 CAPSULE DAILY 90 capsule 1    candesartan (ATACAND) 16 MG tablet TAKE 1 TABLET DAILY 90 tablet 1    fexofenadine (ALLEGRA) 60 MG tablet Take 1 tablet by mouth daily 30 tablet 1    ondansetron (ZOFRAN) 4 MG tablet Take 1 tablet by mouth every 8 hours as needed for Nausea or Vomiting 30 tablet 2    fluticasone (FLONASE) 50 MCG/ACT nasal spray SPRAY ONE SPRAY IN EACH NOSTRIL ONCE DAILY 1 each 11    latanoprost (XALATAN) 0.005 % ophthalmic solution Place 1 drop into the right eye nightly      SUMAtriptan (IMITREX) 50 MG tablet TAKE ONE TABLET BY MOUTH AT ONSET OF HEADACHE; MAY REPEAT ONE TABLET IN 2 HOURS IF NEEDED. 12 tablet 2    Calcium Citrate-Vitamin D (CALCIUM + D PO) Take by mouth daily       polyethylene glycol (GLYCOLAX) 17 g packet Take 1 packet by mouth every other day      Misc Intestinal Lou Regulat (ALIGN PO) Take  by mouth.             Physical Examination       Well appearing.    1.  Clear.          Assessment and Plan     1. Chronic urticaria - much better controlled    Continue fexofenadine 60 mg twice daily.     Consider Pepcid or Singulair in the future if needed.        --Sacha Aguilera MD

## 2024-03-12 ENCOUNTER — TELEPHONE (OUTPATIENT)
Dept: INTERNAL MEDICINE CLINIC | Age: 74
End: 2024-03-12

## 2024-03-12 DIAGNOSIS — M81.0 AGE-RELATED OSTEOPOROSIS WITHOUT CURRENT PATHOLOGICAL FRACTURE: Primary | ICD-10-CM

## 2024-03-12 RX ORDER — ZOLEDRONIC ACID 5 MG/100ML
5 INJECTION, SOLUTION INTRAVENOUS ONCE
Qty: 100 ML | Refills: 0 | Status: SHIPPED | OUTPATIENT
Start: 2024-03-12 | End: 2024-03-12

## 2024-03-12 NOTE — TELEPHONE ENCOUNTER
----- Message from Judy Mcdaniel MD sent at 3/6/2024 11:21 AM EST -----  Regarding: Order Reclast and labs

## 2024-03-12 NOTE — TELEPHONE ENCOUNTER
Reclast script signed- please FAX with form to [facility]. Labs already ordered. Please notify patient when complete.

## 2024-03-13 RX ORDER — SODIUM CHLORIDE 9 MG/ML
5-250 INJECTION, SOLUTION INTRAVENOUS PRN
OUTPATIENT
Start: 2024-03-13

## 2024-03-13 RX ORDER — ZOLEDRONIC ACID 5 MG/100ML
5 INJECTION, SOLUTION INTRAVENOUS ONCE
OUTPATIENT
Start: 2024-03-13 | End: 2024-03-13

## 2024-03-13 RX ORDER — SODIUM CHLORIDE 0.9 % (FLUSH) 0.9 %
5-40 SYRINGE (ML) INJECTION PRN
OUTPATIENT
Start: 2024-03-13

## 2024-03-14 NOTE — TELEPHONE ENCOUNTER
Spoke with patient to advise to get labs done. States she was advised by infusion center that they are waiting on our order & to wait to have labs drawn until about a week prior to the infusion.    Order faxed

## 2024-03-21 ENCOUNTER — PATIENT MESSAGE (OUTPATIENT)
Dept: INTERNAL MEDICINE CLINIC | Age: 74
End: 2024-03-21

## 2024-03-21 RX ORDER — FLUOXETINE HYDROCHLORIDE 20 MG/1
20 CAPSULE ORAL DAILY
Qty: 90 CAPSULE | Refills: 1 | Status: SHIPPED | OUTPATIENT
Start: 2024-03-21

## 2024-03-21 NOTE — TELEPHONE ENCOUNTER
From: Kathy Crane  To: Dr. Judy Mcdaniel  Sent: 3/21/2024 4:57 PM EDT  Subject: Dosage increase     Dr. Mcdaniel, I would like to increase my dosage of FLUoxetine HCL 10 mg to 20 mg. I’ve already tried 20 which we discussed during my last visit and it has helped me feel better. Could you write me a new prescription for 20 mg bc I am running low on the capsules. (I have refills on the 10 but just want to take the 20 mg. ) Thank you . My [facility] is [facility] at [address] [place] [address]; phone [phone]. Thank you for your help. Kathy RAMOS

## 2024-03-22 ENCOUNTER — HOSPITAL ENCOUNTER (OUTPATIENT)
Dept: WOMENS IMAGING | Age: 74
Discharge: HOME | End: 2024-03-22
Payer: MEDICARE

## 2024-03-22 DIAGNOSIS — Z12.31 VISIT FOR SCREENING MAMMOGRAM: ICD-10-CM

## 2024-03-22 PROCEDURE — 77063 BREAST TOMOSYNTHESIS BI: CPT

## 2024-03-30 RX ORDER — SERTRALINE HYDROCHLORIDE 25 MG/1
25 TABLET, FILM COATED ORAL DAILY
Qty: 90 TABLET | OUTPATIENT
Start: 2024-03-30

## 2024-04-01 NOTE — PROGRESS NOTES
Date of Visit:  2024    Kathy Crane, was evaluated through a synchronous (real-time) audio-video encounter. The patient (or guardian if applicable) is aware that this is a billable service, which includes applicable co-pays. This Virtual Visit was conducted with patient's (and/or legal guardian's) consent. Patient identification was verified, and a caregiver was present when appropriate.   The patient was located at Home: [address] [place], [place] [address]  Provider was located at Home (Appt Dept State): [place]  Confirm you are appropriately licensed, registered, or certified to deliver care in the state where the patient is located as indicated above. If you are not or unsure, please re-schedule the visit: Yes, I confirm.      CC: Kathy Crane (: 1950) is a 73 y.o. female, established patient, presenting virtually for evaluation/re-evaluation of the following medical concern(s):    ASSESSMENT/PLAN:  Below is the assessment and plan developed based on review of pertinent history, physical exam, labs, studies, and medications.  Thrombophlebitis of superficial veins of left lower extremity  Assessment & Plan:  No evidence of extension into deep veins. Supportive care guidelines provided. She will continue 1 EC 81 mg ASA/day. Referred to vascular surgery to consider options to address underlying venous reflux. Patient will call if symptoms change or worsen.    Orders:  -     Glenroy Cruz MD, Vascular/Endovascular Surgery, [facility]          2024     6:42 PM   Patient-Reported Vitals   Patient-Reported Weight 149   Patient-Reported Height Around 5’ 4 2”   Patient-Reported Systolic 142 mmHg   Patient-Reported Diastolic 75 mmHg   Patient-Reported Pulse 65   Patient-Reported SpO2 96        Wt Readings from Last 3 Encounters:   23 66.7 kg (147 lb)   23 67.1 kg (148 lb)   23 65.8 kg (145 lb)     BP Readings from Last 3 Encounters:   23 (!) 149/69

## 2024-04-02 ENCOUNTER — TELEMEDICINE (OUTPATIENT)
Dept: INTERNAL MEDICINE CLINIC | Age: 74
End: 2024-04-02

## 2024-04-02 ENCOUNTER — HOSPITAL ENCOUNTER (OUTPATIENT)
Dept: VASCULAR LAB | Age: 74
Discharge: HOME | End: 2024-04-02
Attending: INTERNAL MEDICINE
Payer: MEDICARE

## 2024-04-02 DIAGNOSIS — I80.02 THROMBOPHLEBITIS OF SUPERFICIAL VEINS OF LEFT LOWER EXTREMITY: Primary | ICD-10-CM

## 2024-04-02 DIAGNOSIS — M79.605 LEFT LEG PAIN: ICD-10-CM

## 2024-04-02 PROCEDURE — 93971 EXTREMITY STUDY: CPT

## 2024-04-02 RX ORDER — LANSOPRAZOLE 30 MG/1
30 CAPSULE, DELAYED RELEASE ORAL DAILY
Qty: 90 CAPSULE | Refills: 1 | Status: SHIPPED | OUTPATIENT
Start: 2024-04-02

## 2024-04-02 ASSESSMENT — PATIENT HEALTH QUESTIONNAIRE - PHQ9
SUM OF ALL RESPONSES TO PHQ QUESTIONS 1-9: 0
3. TROUBLE FALLING OR STAYING ASLEEP: NOT AT ALL
SUM OF ALL RESPONSES TO PHQ QUESTIONS 1-9: 0
6. FEELING BAD ABOUT YOURSELF - OR THAT YOU ARE A FAILURE OR HAVE LET YOURSELF OR YOUR FAMILY DOWN: NOT AT ALL
9. THOUGHTS THAT YOU WOULD BE BETTER OFF DEAD, OR OF HURTING YOURSELF: NOT AT ALL
1. LITTLE INTEREST OR PLEASURE IN DOING THINGS: NOT AT ALL
5. POOR APPETITE OR OVEREATING: NOT AT ALL
10. IF YOU CHECKED OFF ANY PROBLEMS, HOW DIFFICULT HAVE THESE PROBLEMS MADE IT FOR YOU TO DO YOUR WORK, TAKE CARE OF THINGS AT HOME, OR GET ALONG WITH OTHER PEOPLE: NOT DIFFICULT AT ALL
4. FEELING TIRED OR HAVING LITTLE ENERGY: NOT AT ALL
SUM OF ALL RESPONSES TO PHQ QUESTIONS 1-9: 0
7. TROUBLE CONCENTRATING ON THINGS, SUCH AS READING THE NEWSPAPER OR WATCHING TELEVISION: NOT AT ALL
SUM OF ALL RESPONSES TO PHQ9 QUESTIONS 1 & 2: 0
2. FEELING DOWN, DEPRESSED OR HOPELESS: NOT AT ALL
8. MOVING OR SPEAKING SO SLOWLY THAT OTHER PEOPLE COULD HAVE NOTICED. OR THE OPPOSITE, BEING SO FIGETY OR RESTLESS THAT YOU HAVE BEEN MOVING AROUND A LOT MORE THAN USUAL: NOT AT ALL
SUM OF ALL RESPONSES TO PHQ QUESTIONS 1-9: 0

## 2024-04-02 NOTE — ASSESSMENT & PLAN NOTE
No evidence of extension into deep veins. Supportive care guidelines provided. She will continue 1 EC 81 mg ASA/day. Referred to vascular surgery to consider options to address underlying venous reflux. Patient will call if symptoms change or worsen.

## 2024-04-15 RX ORDER — LEVOTHYROXINE SODIUM 112 UG/1
112 TABLET ORAL DAILY
Qty: 90 TABLET | Refills: 1 | Status: SHIPPED | OUTPATIENT
Start: 2024-04-15

## 2024-05-02 DIAGNOSIS — E78.00 PURE HYPERCHOLESTEROLEMIA: ICD-10-CM

## 2024-05-02 DIAGNOSIS — D72.810 LYMPHOCYTOPENIA: ICD-10-CM

## 2024-05-02 DIAGNOSIS — E03.4 HYPOTHYROIDISM DUE TO ACQUIRED ATROPHY OF THYROID: ICD-10-CM

## 2024-05-02 DIAGNOSIS — I10 ESSENTIAL HYPERTENSION: ICD-10-CM

## 2024-05-02 LAB
ALBUMIN SERPL-MCNC: 4.2 G/DL (ref 3.4–5)
ALBUMIN/GLOB SERPL: 1.8 {RATIO} (ref 1.1–2.2)
ALP SERPL-CCNC: 69 U/L (ref 40–129)
ALT SERPL-CCNC: 15 U/L (ref 10–40)
ANION GAP SERPL CALCULATED.3IONS-SCNC: 10 MMOL/L (ref 3–16)
AST SERPL-CCNC: 23 U/L (ref 15–37)
BASOPHILS # BLD: 0 K/UL (ref 0–0.2)
BASOPHILS NFR BLD: 1.2 %
BILIRUB SERPL-MCNC: 0.4 MG/DL (ref 0–1)
BUN SERPL-MCNC: 10 MG/DL (ref 7–20)
CALCIUM SERPL-MCNC: 9.2 MG/DL (ref 8.3–10.6)
CHLORIDE SERPL-SCNC: 98 MMOL/L (ref 99–110)
CHOLEST SERPL-MCNC: 258 MG/DL (ref 0–199)
CO2 SERPL-SCNC: 29 MMOL/L (ref 21–32)
CREAT SERPL-MCNC: <0.5 MG/DL (ref 0.6–1.2)
DEPRECATED RDW RBC AUTO: 13.7 % (ref 12.4–15.4)
EOSINOPHIL # BLD: 0.1 K/UL (ref 0–0.6)
EOSINOPHIL NFR BLD: 4.1 %
GFR SERPLBLD CREATININE-BSD FMLA CKD-EPI: >90 ML/MIN/{1.73_M2}
GLUCOSE P FAST SERPL-MCNC: 86 MG/DL (ref 70–99)
HCT VFR BLD AUTO: 35.2 % (ref 36–48)
HDLC SERPL-MCNC: 70 MG/DL (ref 40–60)
HGB BLD-MCNC: 11.7 G/DL (ref 12–16)
LDL CHOLESTEROL: 172 MG/DL
LYMPHOCYTES # BLD: 0.8 K/UL (ref 1–5.1)
LYMPHOCYTES NFR BLD: 30.7 %
MCH RBC QN AUTO: 29.4 PG (ref 26–34)
MCHC RBC AUTO-ENTMCNC: 33.2 G/DL (ref 31–36)
MCV RBC AUTO: 88.4 FL (ref 80–100)
MONOCYTES # BLD: 0.3 K/UL (ref 0–1.3)
MONOCYTES NFR BLD: 12.6 %
NEUTROPHILS # BLD: 1.3 K/UL (ref 1.7–7.7)
NEUTROPHILS NFR BLD: 51.4 %
PLATELET # BLD AUTO: 180 K/UL (ref 135–450)
PMV BLD AUTO: 9.5 FL (ref 5–10.5)
POTASSIUM SERPL-SCNC: 4.3 MMOL/L (ref 3.5–5.1)
PROT SERPL-MCNC: 6.6 G/DL (ref 6.4–8.2)
RBC # BLD AUTO: 3.99 M/UL (ref 4–5.2)
SODIUM SERPL-SCNC: 137 MMOL/L (ref 136–145)
TRIGL SERPL-MCNC: 80 MG/DL (ref 0–150)
TSH SERPL DL<=0.005 MIU/L-ACNC: 5.22 UIU/ML (ref 0.27–4.2)
VLDLC SERPL CALC-MCNC: 16 MG/DL
WBC # BLD AUTO: 2.5 K/UL (ref 4–11)

## 2024-05-07 RX ORDER — LEVOTHYROXINE SODIUM 0.12 MG/1
125 TABLET ORAL DAILY
Qty: 90 TABLET | Refills: 1 | Status: SHIPPED | OUTPATIENT
Start: 2024-05-07

## 2024-05-17 ENCOUNTER — HOSPITAL ENCOUNTER (OUTPATIENT)
Dept: ONCOLOGY | Age: 74
Setting detail: INFUSION SERIES
Discharge: HOME | End: 2024-05-17
Payer: MEDICARE

## 2024-05-17 VITALS
RESPIRATION RATE: 16 BRPM | SYSTOLIC BLOOD PRESSURE: 134 MMHG | DIASTOLIC BLOOD PRESSURE: 77 MMHG | TEMPERATURE: 98.4 F | HEART RATE: 53 BPM

## 2024-05-17 DIAGNOSIS — M81.0 AGE-RELATED OSTEOPOROSIS WITHOUT CURRENT PATHOLOGICAL FRACTURE: Primary | ICD-10-CM

## 2024-05-17 PROCEDURE — 2580000003 HC RX 258: Performed by: INTERNAL MEDICINE

## 2024-05-17 PROCEDURE — 6360000002 HC RX W HCPCS: Performed by: INTERNAL MEDICINE

## 2024-05-17 PROCEDURE — 99211 OFF/OP EST MAY X REQ PHY/QHP: CPT

## 2024-05-17 PROCEDURE — 96365 THER/PROPH/DIAG IV INF INIT: CPT

## 2024-05-17 RX ORDER — SODIUM CHLORIDE 0.9 % (FLUSH) 0.9 %
5-40 SYRINGE (ML) INJECTION PRN
Status: CANCELLED | OUTPATIENT
Start: 2025-05-16

## 2024-05-17 RX ORDER — SODIUM CHLORIDE 9 MG/ML
5-250 INJECTION, SOLUTION INTRAVENOUS PRN
Status: CANCELLED | OUTPATIENT
Start: 2025-05-16

## 2024-05-17 RX ORDER — SODIUM CHLORIDE 9 MG/ML
5-250 INJECTION, SOLUTION INTRAVENOUS PRN
Status: DISCONTINUED | OUTPATIENT
Start: 2024-05-17 | End: 2024-05-17

## 2024-05-17 RX ORDER — SODIUM CHLORIDE 0.9 % (FLUSH) 0.9 %
5-40 SYRINGE (ML) INJECTION PRN
Status: DISCONTINUED | OUTPATIENT
Start: 2024-05-17 | End: 2024-05-17

## 2024-05-17 RX ORDER — ZOLEDRONIC ACID 5 MG/100ML
5 INJECTION, SOLUTION INTRAVENOUS ONCE
Status: COMPLETED | OUTPATIENT
Start: 2024-05-17 | End: 2024-05-17

## 2024-05-17 RX ORDER — ZOLEDRONIC ACID 5 MG/100ML
5 INJECTION, SOLUTION INTRAVENOUS ONCE
Status: CANCELLED | OUTPATIENT
Start: 2025-05-16 | End: 2025-05-16

## 2024-05-17 RX ADMIN — SODIUM CHLORIDE 250 ML/HR: 9 INJECTION, SOLUTION INTRAVENOUS at 08:49

## 2024-05-17 RX ADMIN — ZOLEDRONIC ACID 5 MG: 0.05 INJECTION, SOLUTION INTRAVENOUS at 08:50

## 2024-05-17 RX ADMIN — Medication 10 ML: at 08:47

## 2024-07-12 ENCOUNTER — OFFICE VISIT (OUTPATIENT)
Dept: VASCULAR SURGERY | Age: 74
End: 2024-07-12
Payer: MEDICARE

## 2024-07-12 VITALS — RESPIRATION RATE: 17 BRPM | WEIGHT: 147 LBS | HEIGHT: 64 IN | BODY MASS INDEX: 25.1 KG/M2

## 2024-07-12 DIAGNOSIS — I83.892 VARICOSE VEINS OF LEFT LOWER EXTREMITY WITH COMPLICATIONS: Primary | ICD-10-CM

## 2024-07-12 DIAGNOSIS — I80.02 THROMBOPHLEBITIS OF SUPERFICIAL VEINS OF LEFT LOWER EXTREMITY: ICD-10-CM

## 2024-07-12 PROCEDURE — 1123F ACP DISCUSS/DSCN MKR DOCD: CPT | Performed by: SURGERY

## 2024-07-12 PROCEDURE — 99204 OFFICE O/P NEW MOD 45 MIN: CPT | Performed by: SURGERY

## 2024-07-12 ASSESSMENT — ENCOUNTER SYMPTOMS
EYES NEGATIVE: 1
ALLERGIC/IMMUNOLOGIC NEGATIVE: 1
RESPIRATORY NEGATIVE: 1
GASTROINTESTINAL NEGATIVE: 1

## 2024-07-12 NOTE — PROGRESS NOTES
Kathy Crane   73 y.o. female referred by Judy Mcdaniel MD for evaluation following diagnosis of left calf superficial venous thrombophlebitis in April.  The complaints in her left lower calf began over the course of a few months during the winter and eventually she underwent duplex scan which confirmed the presence of a superficial clot in varicosities.  Before that she had noticed some veins in that area which have subsequently totally disappeared.  Has not worn compression stockings due to rashes that develop when she wears any type of garment on her skin.  No previous history of SVT or DVT.  No history of ulceration, swelling or bleeding.  No previous venous interventions.  Denies any significant leg discomfort other than what was associated with the episode of inflammation and tenderness. +FH of VV.    Past Medical History:   Diagnosis Date    Allergic rhinitis     Anxiety     Depression     Hiatal hernia     Hyperlipidemia     Hypertension     Hypothyroidism     Migraine headache     Osteoporosis     Overactive bladder     Rupture of tendon of foot region     Right    Spondyloarthropathy     L5-S1    Tear of meniscus of left knee 9/5/2014     Past Surgical History:   Procedure Laterality Date    ANKLE SURGERY  1/17/11    Right gastroc lenghtening, proximal tibial bone graft triple arthrodesis of ankle    DILATION AND CURETTAGE OF UTERUS      x2    KNEE ARTHROSCOPY Left 1/28/15    TEAR DUCT SURGERY  2002     Allergies   Allergen Reactions    Cephalexin Hives     Current Outpatient Medications   Medication Sig Dispense Refill    levothyroxine (SYNTHROID) 125 MCG tablet Take 1 tablet by mouth daily 90 tablet 1    lansoprazole (PREVACID) 30 MG delayed release capsule Take 1 capsule by mouth daily 90 capsule 1    FLUoxetine (PROZAC) 20 MG capsule Take 1 capsule by mouth daily 90 capsule 1    clobetasol (TEMOVATE) 0.05 % cream APPLY TO AFFECTED AREA(S) ON THE LEGS TWO TIMES A DAY AS NEEDED FOR RECURRENCE OF

## 2024-07-24 ENCOUNTER — HOSPITAL ENCOUNTER (OUTPATIENT)
Dept: CT IMAGING | Age: 74
Discharge: HOME | End: 2024-07-24
Attending: INTERNAL MEDICINE
Payer: MEDICARE

## 2024-07-24 DIAGNOSIS — E78.00 PURE HYPERCHOLESTEROLEMIA: ICD-10-CM

## 2024-07-24 PROCEDURE — 75571 CT HRT W/O DYE W/CA TEST: CPT

## 2024-09-16 ENCOUNTER — PATIENT MESSAGE (OUTPATIENT)
Dept: DERMATOLOGY | Age: 74
End: 2024-09-16

## 2024-09-16 RX ORDER — CLOBETASOL PROPIONATE 0.5 MG/G
CREAM TOPICAL
Qty: 60 G | Refills: 1 | Status: SHIPPED | OUTPATIENT
Start: 2024-09-16

## 2024-09-17 ENCOUNTER — OFFICE VISIT (OUTPATIENT)
Dept: DERMATOLOGY | Age: 74
End: 2024-09-17
Payer: MEDICARE

## 2024-09-17 DIAGNOSIS — L50.8 CHRONIC URTICARIA: Primary | ICD-10-CM

## 2024-09-17 PROCEDURE — 1123F ACP DISCUSS/DSCN MKR DOCD: CPT | Performed by: DERMATOLOGY

## 2024-09-17 PROCEDURE — 99213 OFFICE O/P EST LOW 20 MIN: CPT | Performed by: DERMATOLOGY

## 2024-09-17 RX ORDER — MONTELUKAST SODIUM 10 MG/1
10 TABLET ORAL DAILY
Qty: 30 TABLET | Refills: 3 | Status: SHIPPED | OUTPATIENT
Start: 2024-09-17

## 2024-10-14 ENCOUNTER — TELEPHONE (OUTPATIENT)
Dept: VASCULAR SURGERY | Age: 74
End: 2024-10-14

## 2024-10-17 ENCOUNTER — OFFICE VISIT (OUTPATIENT)
Dept: VASCULAR SURGERY | Age: 74
End: 2024-10-17
Payer: MEDICARE

## 2024-10-17 VITALS
WEIGHT: 156 LBS | HEIGHT: 64 IN | SYSTOLIC BLOOD PRESSURE: 120 MMHG | BODY MASS INDEX: 26.63 KG/M2 | DIASTOLIC BLOOD PRESSURE: 70 MMHG

## 2024-10-17 DIAGNOSIS — I80.02 THROMBOPHLEBITIS OF SUPERFICIAL VEINS OF LEFT LOWER EXTREMITY: Primary | ICD-10-CM

## 2024-10-17 PROCEDURE — 3074F SYST BP LT 130 MM HG: CPT | Performed by: SURGERY

## 2024-10-17 PROCEDURE — 3078F DIAST BP <80 MM HG: CPT | Performed by: SURGERY

## 2024-10-17 PROCEDURE — 99213 OFFICE O/P EST LOW 20 MIN: CPT | Performed by: SURGERY

## 2024-10-17 PROCEDURE — 1123F ACP DISCUSS/DSCN MKR DOCD: CPT | Performed by: SURGERY

## 2024-10-17 NOTE — PROGRESS NOTES
Seen back for varicose veins & SVT L leg.  Since the events all VVs have cleared by her description and previous exam.  Pt has not been wearing the prescribed stockings as she develops a rash whenever she wears tight garments and currently is on meds for rashes.  No reported edema, bleeding, tender cord, skin changes or ulceration.  Recent venous reflux scan performed on 10/17/2024 at [facility]    EXAM:  No edema, ulceration or dermatitis.    No gross VVs seen. No palpable cords.     VRS - L CFV reflux; L GSV reflux midthigh to midcalf    A/P: Chronic superficial venous insufficiency L leg (limited/segmental) with H/O SVT   SEGMENTAL AXIAL REFLUX without SIGNIFICANT VARICOSITIES.   Would not recommend surgery at this time considering intolerance of compression garments as would be required for management.   Also limited segmental reflux without significant varicose veins.  This was discussed in terms that the patient could understand.   I answered all questions pertaining to management options and agree that observation would be appropriate at this time.  Pt is comfortable with observation. RTO if new issues or recurrent SVT.   Time spent counseling and coordination of care: 25 minutes.  More than 50% of visit was spent reviewing and discussing venous duplex scan.     Satisfactory today

## 2025-01-13 RX ORDER — MONTELUKAST SODIUM 10 MG/1
10 TABLET ORAL DAILY
Qty: 30 TABLET | Refills: 3 | Status: SHIPPED | OUTPATIENT
Start: 2025-01-13

## 2025-02-20 ENCOUNTER — HOSPITAL ENCOUNTER (OUTPATIENT)
Dept: VASCULAR LAB | Age: 75
Discharge: HOME | End: 2025-02-22
Attending: INTERNAL MEDICINE
Payer: MEDICARE

## 2025-02-20 DIAGNOSIS — I15.9 SECONDARY HYPERTENSION: ICD-10-CM

## 2025-02-20 LAB
VAS AORTA DIST AP: 1.52 CM
VAS AORTA DIST TR: 1.61 CM
VAS AORTA MID AP: 1.58 CM
VAS AORTA MID PSV: 106 CM/S
VAS AORTA MID TRANS: 1.66 CM
VAS AORTA PROX AP: 1.68 CM
VAS AORTA PROX TR: 1.59 CM
VAS L RENAL ORIG RI: 0.73 NO UNITS
VAS LEFT KIDNEY LENGTH: 13.91 CM
VAS LEFT KIDNEY WIDTH: 5.94 CM
VAS LEFT RENAL DIST EDV: 69.3 CM/S
VAS LEFT RENAL DIST PSV: 261 CM/S
VAS LEFT RENAL DIST RAR: 2.46
VAS LEFT RENAL DIST RI: 0.73 NO UNITS
VAS LEFT RENAL LOWER PARENCHYMA EDV: 8.6 CM/S
VAS LEFT RENAL LOWER PARENCHYMA PSV: 23.1 CM/S
VAS LEFT RENAL LOWER PARENCHYMA RI: 0.63 NO UNITS
VAS LEFT RENAL MID EDV: 75.8 CM/S
VAS LEFT RENAL MID PSV: 244 CM/S
VAS LEFT RENAL MID RAR: 2.3
VAS LEFT RENAL MID RI: 0.69 NO UNITS
VAS LEFT RENAL ORIGIN EDV: 55.6 CM/S
VAS LEFT RENAL ORIGIN PSV: 206 CM/S
VAS LEFT RENAL ORIGIN RAR: 1.94
VAS LEFT RENAL PROX EDV: 63.4 CM/S
VAS LEFT RENAL PROX PSV: 273 CM/S
VAS LEFT RENAL PROX RAR: 2.58
VAS LEFT RENAL PROX RI: 0.77 NO UNITS
VAS LEFT RENAL RAR: 2.58
VAS LEFT RENAL UPPER PARENCHYMA EDV: 10 CM/S
VAS LEFT RENAL UPPER PARENCHYMA PSV: 29.6 CM/S
VAS LEFT RENAL UPPER PARENCHYMA RI: 0.66 NO UNITS
VAS RIGHT KIDNEY LENGTH: 10.45 CM
VAS RIGHT KIDNEY WIDTH: 4.09 CM
VAS RIGHT RENAL DIST EDV: 22.1 CM/S
VAS RIGHT RENAL DIST PSV: 80.1 CM/S
VAS RIGHT RENAL DIST RAR: 0.76
VAS RIGHT RENAL DIST RI: 0.72 NO UNITS
VAS RIGHT RENAL LOWER PARENCHYMA EDV: 10.2 CM/S
VAS RIGHT RENAL LOWER PARENCHYMA PSV: 32.7 CM/S
VAS RIGHT RENAL LOWER PARENCHYMA RI: 0.69 NO UNITS
VAS RIGHT RENAL MID EDV: 56.2 CM/S
VAS RIGHT RENAL MID PSV: 237 CM/S
VAS RIGHT RENAL MID RAR: 2.24
VAS RIGHT RENAL MID RI: 0.76 NO UNITS
VAS RIGHT RENAL ORIGIN EDV: 60.2 CM/S
VAS RIGHT RENAL ORIGIN PSV: 225 CM/S
VAS RIGHT RENAL ORIGIN RAR: 2.12
VAS RIGHT RENAL ORIGIN RI: 0.73 NO UNITS
VAS RIGHT RENAL PROX EDV: 54.1 CM/S
VAS RIGHT RENAL PROX PSV: 210 CM/S
VAS RIGHT RENAL PROX RAR: 1.98
VAS RIGHT RENAL PROX RI: 0.74 NO UNITS
VAS RIGHT RENAL RAR: 2.24
VAS RIGHT RENAL UPPER PARENCHYMA EDV: 6.3 CM/S
VAS RIGHT RENAL UPPER PARENCHYMA PSV: 26.1 CM/S
VAS RIGHT RENAL UPPER PARENCHYMA RI: 0.76 NO UNITS

## 2025-02-20 PROCEDURE — 93975 VASCULAR STUDY: CPT

## 2025-03-13 ENCOUNTER — HOSPITAL ENCOUNTER (OUTPATIENT)
Dept: CT IMAGING | Age: 75
Discharge: HOME | End: 2025-03-13
Attending: INTERNAL MEDICINE
Payer: MEDICARE

## 2025-03-13 DIAGNOSIS — E26.9 HYPERALDOSTERONISM: ICD-10-CM

## 2025-03-13 PROCEDURE — 74150 CT ABDOMEN W/O CONTRAST: CPT

## 2025-05-12 RX ORDER — MONTELUKAST SODIUM 10 MG/1
10 TABLET ORAL DAILY
Qty: 30 TABLET | Refills: 3 | Status: SHIPPED | OUTPATIENT
Start: 2025-05-12

## 2025-05-23 ENCOUNTER — HOSPITAL ENCOUNTER (OUTPATIENT)
Dept: WOMENS IMAGING | Age: 75
Discharge: HOME | End: 2025-05-23
Payer: MEDICARE

## 2025-05-23 VITALS — HEIGHT: 64 IN | WEIGHT: 157 LBS | BODY MASS INDEX: 26.8 KG/M2

## 2025-05-23 DIAGNOSIS — Z12.31 ENCOUNTER FOR SCREENING MAMMOGRAM FOR BREAST CANCER: ICD-10-CM

## 2025-05-23 PROCEDURE — 77063 BREAST TOMOSYNTHESIS BI: CPT
